# Patient Record
Sex: MALE | Race: WHITE | Employment: FULL TIME | URBAN - METROPOLITAN AREA
[De-identification: names, ages, dates, MRNs, and addresses within clinical notes are randomized per-mention and may not be internally consistent; named-entity substitution may affect disease eponyms.]

---

## 2017-01-18 ENCOUNTER — OFFICE VISIT (OUTPATIENT)
Dept: PEDIATRICS | Facility: CLINIC | Age: 47
End: 2017-01-18
Payer: COMMERCIAL

## 2017-01-18 VITALS
WEIGHT: 191.3 LBS | BODY MASS INDEX: 26.78 KG/M2 | SYSTOLIC BLOOD PRESSURE: 130 MMHG | HEIGHT: 71 IN | OXYGEN SATURATION: 98 % | TEMPERATURE: 98 F | DIASTOLIC BLOOD PRESSURE: 82 MMHG | HEART RATE: 74 BPM

## 2017-01-18 DIAGNOSIS — H61.23 BILATERAL IMPACTED CERUMEN: ICD-10-CM

## 2017-01-18 DIAGNOSIS — Z00.00 ENCOUNTER FOR HEALTH MAINTENANCE EXAMINATION IN ADULT: Primary | ICD-10-CM

## 2017-01-18 DIAGNOSIS — Z23 NEED FOR PROPHYLACTIC VACCINATION AND INOCULATION AGAINST INFLUENZA: ICD-10-CM

## 2017-01-18 DIAGNOSIS — R23.4 SCAB: ICD-10-CM

## 2017-01-18 DIAGNOSIS — H91.93 DECREASED HEARING OF BOTH EARS: ICD-10-CM

## 2017-01-18 PROCEDURE — 90471 IMMUNIZATION ADMIN: CPT | Performed by: INTERNAL MEDICINE

## 2017-01-18 PROCEDURE — 90686 IIV4 VACC NO PRSV 0.5 ML IM: CPT | Performed by: INTERNAL MEDICINE

## 2017-01-18 PROCEDURE — 99396 PREV VISIT EST AGE 40-64: CPT | Mod: GE | Performed by: INTERNAL MEDICINE

## 2017-01-18 NOTE — PROGRESS NOTES
Injectable Influenza Immunization Documentation    1.  Is the person to be vaccinated sick today?  No    2. Does the person to be vaccinated have an allergy to eggs or to a component of the vaccine?  No    3. Has the person to be vaccinated today ever had a serious reaction to influenza vaccine in the past?  No    4. Has the person to be vaccinated ever had Guillain-Rosston syndrome?  No     Form completed by Lanny Leonard MA

## 2017-01-18 NOTE — MR AVS SNAPSHOT
After Visit Summary   1/18/2017    Jordan Montano    MRN: [id number]           Patient Information     Date Of Birth          1970        Visit Information        Provider Department      1/18/2017 1:30 PM Robin Sharma MD [facility]        Today's Diagnoses     Encounter for health maintenance examination in adult    -  1       Care Instructions    Hearing: Irrigation of ears today, will monitor. Avoid loud noises. Use ear protection as much as possible. If not improving, will send to Audiology for formal evaluation. Debrox is an over the counter option.  One method is to put baby oil or olive oil into each ear on a regular basis. Put in the oil, let it sit in there for a few minutes, then lie down on a towel to let it drip out again. Probably only a few drops every week in each ear is good enough (nobody has done a research study on this). It doesn t hurt to put a few drops of clean olive oil or baby oil into each ear every day. Swimmers often do this prior to their daily swim. However, be sure that the oil is clean, as you don t want to introduce bacteria. This should not be done if you have an eardrum perforation or if you don t know whether or not you have a perforation.  Right chest wall scab: Aggressive moisturization  Checking cholesterol & glucose today  Flu shot today    Preventive Health Recommendations  Male Ages 40 to 49    Yearly exam:             See your health care provider every year in order to  o   Review health changes.   o   Discuss preventive care.    o   Review your medicines if your doctor has prescribed any.    You should be tested each year for STDs (sexually transmitted diseases) if you re at risk.     Have a cholesterol test every 5 years.     Have a colonoscopy (test for colon cancer) if someone in your family has had colon cancer or polyps before age 50.     After age 45, have a diabetes test (fasting glucose). If you are at risk for diabetes, you  should have this test every 3 years.      Talk with your health care provider about whether or not a prostate cancer screening test (PSA) is right for you.    Shots: Get a flu shot each year. Get a tetanus shot every 10 years.     Nutrition:    Eat at least 5 servings of fruits and vegetables daily.     Eat whole-grain bread, whole-wheat pasta and brown rice instead of white grains and rice.     Talk to your provider about Calcium and Vitamin D.     Lifestyle    Exercise for at least 150 minutes a week (30 minutes a day, 5 days a week). This will help you control your weight and prevent disease.     Limit alcohol to one drink per day.     No smoking.     Wear sunscreen to prevent skin cancer.     See your dentist every six months for an exam and cleaning.            Follow-ups after your visit        Future tests that were ordered for you today     Open Future Orders        Priority Expected Expires Ordered    Lipid Profile (Chol, Trig, HDL, LDL calc) Routine  1/18/2018 1/18/2017            Who to contact     If you have questions or need follow up information about today's clinic visit or your schedule please contact [facility] directly at [phone].  Normal or non-critical lab and imaging results will be communicated to you by LifeScribehart, letter or phone within 4 business days after the clinic has received the results. If you do not hear from us within 7 days, please contact the clinic through NowThis Newst or phone. If you have a critical or abnormal lab result, we will notify you by phone as soon as possible.  Submit refill requests through Paxer or call your pharmacy and they will forward the refill request to us. Please allow 3 business days for your refill to be completed.          Additional Information About Your Visit        Paxer Information     Paxer lets you send messages to your doctor, view your test results, renew your prescriptions, schedule appointments and more. To sign up, go to  [email or web address] . Click on \"Log in\" on the left side of the screen, which will take you to the Welcome page. Then click on \"Sign up Now\" on the right side of the page.     You will be asked to enter the access code listed below, as well as some personal information. Please follow the directions to create your username and password.     Your access code is: [email or web address]  Expires: 2017  2:16 PM     Your access code will  in 90 days. If you need help or a new code, please call your [place] clinic or [phone].        Care EveryWhere ID     This is your Care EveryWhere ID. This could be used by other organizations to access your [place] medical records  [id number]        Your Vitals Were     Pulse Temperature Height BMI (Body Mass Index) Pulse Oximetry       74 98  F (36.7  C) (Tympanic) 5' 11\" (1.803 m) 26.69 kg/m2 98%        Blood Pressure from Last 3 Encounters:   17 130/82   14 130/82   12 108/60    Weight from Last 3 Encounters:   17 191 lb 4.8 oz (86.773 kg)   14 181 lb (82.101 kg)   12 185 lb (83.915 kg)              We Performed the Following     GLUCOSE        Primary Care Provider Office Phone # Fax #    Franklin Olivas [phone]       [facility] [address] [place] [address]        Thank you!     Thank you for choosing [facility]  for your care. Our goal is always to provide you with excellent care. Hearing back from our patients is one way we can continue to improve our services. Please take a few minutes to complete the written survey that you may receive in the mail after your visit with us. Thank you!             Your Updated Medication List - Protect others around you: Learn how to safely use, store and throw away your medicines at www.disposemymeds.org.      Notice  As of 2017  2:18 PM    You have not been prescribed any medications.      "

## 2017-01-18 NOTE — NURSING NOTE
"Chief Complaint   Patient presents with     Physical       Initial /82 mmHg  Pulse 74  Temp(Src) 98  F (36.7  C) (Tympanic)  Ht 5' 11\" (1.803 m)  Wt 191 lb 4.8 oz (86.773 kg)  BMI 26.69 kg/m2  SpO2 98% Estimated body mass index is 26.69 kg/(m^2) as calculated from the following:    Height as of this encounter: 5' 11\" (1.803 m).    Weight as of this encounter: 191 lb 4.8 oz (86.773 kg).  BP completed using cuff size: regular    "
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
## 2017-01-18 NOTE — PATIENT INSTRUCTIONS
Hearing: Irrigation of ears today, will monitor. Avoid loud noises. Use ear protection as much as possible. If not improving, will send to Audiology for formal evaluation. Debrox is an over the counter option.  One method is to put baby oil or olive oil into each ear on a regular basis. Put in the oil, let it sit in there for a few minutes, then lie down on a towel to let it drip out again. Probably only a few drops every week in each ear is good enough (nobody has done a research study on this). It doesn t hurt to put a few drops of clean olive oil or baby oil into each ear every day. Swimmers often do this prior to their daily swim. However, be sure that the oil is clean, as you don t want to introduce bacteria. This should not be done if you have an eardrum perforation or if you don t know whether or not you have a perforation.  Right chest wall scab: Aggressive moisturization  Checking cholesterol & glucose today  Flu shot today    Preventive Health Recommendations  Male Ages 40 to 49    Yearly exam:             See your health care provider every year in order to  o   Review health changes.   o   Discuss preventive care.    o   Review your medicines if your doctor has prescribed any.    You should be tested each year for STDs (sexually transmitted diseases) if you re at risk.     Have a cholesterol test every 5 years.     Have a colonoscopy (test for colon cancer) if someone in your family has had colon cancer or polyps before age 50.     After age 45, have a diabetes test (fasting glucose). If you are at risk for diabetes, you should have this test every 3 years.      Talk with your health care provider about whether or not a prostate cancer screening test (PSA) is right for you.    Shots: Get a flu shot each year. Get a tetanus shot every 10 years.     Nutrition:    Eat at least 5 servings of fruits and vegetables daily.     Eat whole-grain bread, whole-wheat pasta and brown rice instead of white grains and  rice.     Talk to your provider about Calcium and Vitamin D.     Lifestyle    Exercise for at least 150 minutes a week (30 minutes a day, 5 days a week). This will help you control your weight and prevent disease.     Limit alcohol to one drink per day.     No smoking.     Wear sunscreen to prevent skin cancer.     See your dentist every six months for an exam and cleaning.

## 2017-01-20 DIAGNOSIS — Z00.00 ENCOUNTER FOR HEALTH MAINTENANCE EXAMINATION IN ADULT: ICD-10-CM

## 2017-01-20 LAB
CHOLEST SERPL-MCNC: 151 MG/DL
GLUCOSE SERPL-MCNC: 101 MG/DL (ref 70–99)
HDLC SERPL-MCNC: 42 MG/DL
LDLC SERPL CALC-MCNC: 88 MG/DL
NONHDLC SERPL-MCNC: 109 MG/DL
TRIGL SERPL-MCNC: 105 MG/DL

## 2017-01-20 PROCEDURE — 80061 LIPID PANEL: CPT | Performed by: PEDIATRICS

## 2017-01-20 PROCEDURE — 36415 COLL VENOUS BLD VENIPUNCTURE: CPT | Performed by: PEDIATRICS

## 2017-01-20 PROCEDURE — 82947 ASSAY GLUCOSE BLOOD QUANT: CPT | Performed by: PEDIATRICS

## 2017-01-20 NOTE — Clinical Note
[facility]  [address] [address]                  [phone]   January 25, 2017    Jordan Montano  [address] [facility] [address]          Jordan,    Thank you for coming in last Wednesday & having your labs checked this week. Your cholesterol testing was completely normal. Your glucose testing was slightly elevated, which could be one the earliest indications for pre-diabetes. Eating healthy & exercising will help with weight as well as glucose control. We will check this again next year at your next physical. Feel free to call the clinic with any questions or concerns.    Robin Sharma MD  [facility]          Results for orders placed or performed in visit on 01/20/17   Lipid Profile (Chol, Trig, HDL, LDL calc)   Result Value Ref Range    Cholesterol 151 <200 mg/dL    Triglycerides 105 <150 mg/dL    HDL Cholesterol 42 >39 mg/dL    LDL Cholesterol Calculated 88 <100 mg/dL    Non HDL Cholesterol 109 <130 mg/dL   Glucose   Result Value Ref Range    Glucose 101 (H) 70 - 99 mg/dL

## 2017-01-25 NOTE — PROGRESS NOTES
Quick Note:    Please mail results to patient. Merlyn Hollisnt,    Thank you for coming in last Wednesday & having your labs checked this week. Your cholesterol testing was completely normal. Your glucose testing was slightly elevated, which could be one the earliest indications for pre-diabetes. Eating healthy & exercising will help with weight as well as glucose control. We will check this again next year at your next physical. Feel free to call the clinic with any questions or concerns.    Robin Sharma MD  [facility]  ______

## 2018-03-22 ENCOUNTER — TRANSFERRED RECORDS (OUTPATIENT)
Dept: HEALTH INFORMATION MANAGEMENT | Facility: CLINIC | Age: 48
End: 2018-03-22

## 2018-03-23 ENCOUNTER — OFFICE VISIT (OUTPATIENT)
Dept: FAMILY MEDICINE | Facility: CLINIC | Age: 48
End: 2018-03-23
Payer: COMMERCIAL

## 2018-03-23 VITALS
RESPIRATION RATE: 18 BRPM | HEART RATE: 102 BPM | SYSTOLIC BLOOD PRESSURE: 118 MMHG | BODY MASS INDEX: 27.77 KG/M2 | OXYGEN SATURATION: 96 % | WEIGHT: 198.4 LBS | HEIGHT: 71 IN | TEMPERATURE: 97.4 F | DIASTOLIC BLOOD PRESSURE: 78 MMHG

## 2018-03-23 DIAGNOSIS — N20.1 CALCULUS OF URETER: Primary | ICD-10-CM

## 2018-03-23 PROBLEM — N40.0 BPH (BENIGN PROSTATIC HYPERPLASIA): Status: ACTIVE | Noted: 2018-03-23

## 2018-03-23 PROCEDURE — 99000 SPECIMEN HANDLING OFFICE-LAB: CPT | Performed by: PHYSICIAN ASSISTANT

## 2018-03-23 PROCEDURE — 99214 OFFICE O/P EST MOD 30 MIN: CPT | Performed by: PHYSICIAN ASSISTANT

## 2018-03-23 PROCEDURE — 82365 CALCULUS SPECTROSCOPY: CPT | Mod: 90 | Performed by: PHYSICIAN ASSISTANT

## 2018-03-23 RX ORDER — CIPROFLOXACIN 500 MG/1
500 TABLET, FILM COATED ORAL 2 TIMES DAILY
Refills: 0 | COMMUNITY
Start: 2018-03-22 | End: 2018-04-23

## 2018-03-23 RX ORDER — OXYCODONE AND ACETAMINOPHEN 5; 325 MG/1; MG/1
5-325 TABLET ORAL EVERY 6 HOURS
Refills: 0 | COMMUNITY
Start: 2018-03-22 | End: 2018-04-23

## 2018-03-23 RX ORDER — TAMSULOSIN HYDROCHLORIDE 0.4 MG/1
0.4 CAPSULE ORAL DAILY
Refills: 0 | COMMUNITY
Start: 2018-03-22 | End: 2018-04-23

## 2018-03-23 NOTE — MR AVS SNAPSHOT
"              After Visit Summary   3/23/2018    Jordan Montano    MRN: [id number]           Patient Information     Date Of Birth          1970        Visit Information        Provider Department      3/23/2018 1:40 PM Juan Ramon Staley PA-C [facility]        Today's Diagnoses     Calculus of ureter    -  1       Follow-ups after your visit        Follow-up notes from your care team     Return for recheck if symptoms are not improving or per lab results.      Who to contact     If you have questions or need follow up information about today's clinic visit or your schedule please contact [facility] directly at [phone].  Normal or non-critical lab and imaging results will be communicated to you by MyChart, letter or phone within 4 business days after the clinic has received the results. If you do not hear from us within 7 days, please contact the clinic through Lvmamahart or phone. If you have a critical or abnormal lab result, we will notify you by phone as soon as possible.  Submit refill requests through [email or web address] or call your pharmacy and they will forward the refill request to us. Please allow 3 business days for your refill to be completed.          Additional Information About Your Visit        MyChart Information     [email or web address] lets you send messages to your doctor, view your test results, renew your prescriptions, schedule appointments and more. To sign up, go to [email or web address] . Click on \"Log in\" on the left side of the screen, which will take you to the Welcome page. Then click on \"Sign up Now\" on the right side of the page.     You will be asked to enter the access code listed below, as well as some personal information. Please follow the directions to create your username and password.     Your access code is: [email or web address]  Expires: 2018  2:22 PM     Your access code will  in 90 days. If you need help or a new code, please call your [place] " "clinic or [phone].        Care EveryWhere ID     This is your Care EveryWhere ID. This could be used by other organizations to access your [place] medical records  [id number]        Your Vitals Were     Pulse Temperature Respirations Height Pulse Oximetry BMI (Body Mass Index)    102 97.4  F (36.3  C) (Tympanic) 18 5' 11\" (1.803 m) 96% 27.67 kg/m2       Blood Pressure from Last 3 Encounters:   03/23/18 118/78   01/18/17 130/82   06/21/14 130/82    Weight from Last 3 Encounters:   03/23/18 198 lb 6.4 oz (90 kg)   01/18/17 191 lb 4.8 oz (86.8 kg)   06/21/14 181 lb (82.1 kg)               Primary Care Provider Office Phone # Fax #    Franklin Olivas [phone] [address] [place] [address]        Equal Access to Services     [facility]: Hadii aad ku hadasho Soomaali, waaxda luqadaha, qaybta kaalmada adeegyada, waxay kelechi salazar . So [facility] [phone].    ATENCIÓN: Si asherla espaga, tiene a castro disposición servicios gratuitos de asistencia lingüística. Llame al [phone].    We comply with applicable federal civil rights laws and [place] laws. We do not discriminate on the basis of race, color, national origin, age, disability, sex, sexual orientation, or gender identity.            Thank you!     Thank you for choosing [facility]  for your care. Our goal is always to provide you with excellent care. Hearing back from our patients is one way we can continue to improve our services. Please take a few minutes to complete the written survey that you may receive in the mail after your visit with us. Thank you!             Your Updated Medication List - Protect others around you: Learn how to safely use, store and throw away your medicines at www.disposemymeds.org.          This list is accurate as of 3/23/18  2:22 PM.  Always use your most recent med list.                   Brand Name Dispense Instructions for use Diagnosis    ciprofloxacin " 500 MG tablet    CIPRO     Take 500 mg by mouth 2 times daily        oxyCODONE-acetaminophen 5-325 MG per tablet    PERCOCET     Take 5-325 tablets by mouth every 6 hours        tamsulosin 0.4 MG capsule    FLOMAX     Take 0.4 mg by mouth daily

## 2018-03-23 NOTE — PROGRESS NOTES
"  SUBJECTIVE:   Jordan Montano is a 48 year old male who presents to clinic today for the following health issues:    ED/UC Followup:  Facility:  [facility]   Date of visit: 03/22/18  Reason for visit: Kidney Stones   Current Status: Patient states that since being seen, he is feeling better. Patient was given Cipro and Flomax, and both have helped. Patient also states that the stone passed this morning.      -Patient is a 47yo male with recent dx of nephrolithiasis   -initially noted a sudden \"muscle cramp\" that would not go away  -went to urgency room; ct scan showed stone  -got some medication - flomax and cipro; passed the stone this morning  -he denies any hematuria, previously or currently  -urination since that time has been fine  -drinks a bit of coffee, mostly water    Problem list and histories reviewed & adjusted, as indicated.  Additional history: as documented    Patient Active Problem List   Diagnosis     Cardiac Risk Factors     Lumbar Degenerative Disc Dz     CARDIOVASCULAR SCREENING; LDL GOAL LESS THAN 160     Past Surgical History:   Procedure Laterality Date     L4/5 LAMINECT/DISCECTOMY, LUMBAR  2007     VASECTOMY  2005       Social History   Substance Use Topics     Smoking status: Never Smoker     Smokeless tobacco: Never Used     Alcohol use Yes      Comment: occasional social use     Family History   Problem Relation Age of Onset     Family History Negative Mother      Cardiovascular Maternal Uncle      Heart bypass at age 33     Prostate Cancer Maternal Uncle      GASTROINTESTINAL DISEASE Child      celiac sprue     DIABETES No family hx of      Cancer - colorectal No family hx of            Reviewed and updated as needed this visit by clinical staff       Reviewed and updated as needed this visit by Provider         ROS:  Constitutional, HEENT, cardiovascular, pulmonary, gi and gu systems are negative, except as otherwise noted.    OBJECTIVE:     /78 (BP Location: Right arm, " "Patient Position: Chair, Cuff Size: Adult Large)  Pulse 102  Temp 97.4  F (36.3  C) (Tympanic)  Resp 18  Ht 5' 11\" (1.803 m)  Wt 198 lb 6.4 oz (90 kg)  SpO2 96%  BMI 27.67 kg/m2  Body mass index is 27.67 kg/(m^2).  GENERAL: healthy, alert and no distress  RESP: lungs clear to auscultation - no rales, rhonchi or wheezes  CV: regular rate and rhythm, normal S1 S2, no S3 or S4, no murmur, click or rub, no peripheral edema and peripheral pulses strong  ABDOMEN: soft, nontender, no hepatosplenomegaly, no masses and bowel sounds normal  BACK: no CVA tenderness, no paralumbar tenderness    Diagnostic Test Results:  See A/P    ASSESSMENT/PLAN:   1. Calculus of ureter  He did pass this earlier today. Feels improved. He was started on cipro for uti however in viewing his urine exam from the UR there was no evidence for infection. I do note on the CT scan some prostatomegaly, perhaps early prostatitis? We reviewed this as well and discussed prostate cancer screening etc down the road and we'll keep an eye on this. For now we'll analyze the stone and have him finish his abx. He'll follow up with any return of or worsening symptoms.   - Stone analysis; Future  - Stone analysis    Juan Ramon Staley PA-C  [facility] [place]  "

## 2018-03-28 LAB
APPEARANCE STONE: NORMAL
COMPN STONE: NORMAL
NUMBER STONE: 1
SIZE STONE: NORMAL MM
WT STONE: 8 MG

## 2018-03-30 ENCOUNTER — MYC MEDICAL ADVICE (OUTPATIENT)
Dept: FAMILY MEDICINE | Facility: CLINIC | Age: 48
End: 2018-03-30

## 2018-04-18 ENCOUNTER — MYC MEDICAL ADVICE (OUTPATIENT)
Dept: FAMILY MEDICINE | Facility: CLINIC | Age: 48
End: 2018-04-18

## 2018-04-18 NOTE — TELEPHONE ENCOUNTER
Mars:    Pt saw you on 3/23 for kidney stones.   Please see below and advise. Thanks.     Ivett Napier RN -- [facility]

## 2018-04-23 ENCOUNTER — OFFICE VISIT (OUTPATIENT)
Dept: FAMILY MEDICINE | Facility: CLINIC | Age: 48
End: 2018-04-23
Payer: COMMERCIAL

## 2018-04-23 VITALS
HEIGHT: 71 IN | TEMPERATURE: 97.4 F | DIASTOLIC BLOOD PRESSURE: 96 MMHG | RESPIRATION RATE: 18 BRPM | OXYGEN SATURATION: 98 % | WEIGHT: 202.8 LBS | BODY MASS INDEX: 28.39 KG/M2 | HEART RATE: 70 BPM | SYSTOLIC BLOOD PRESSURE: 138 MMHG

## 2018-04-23 DIAGNOSIS — R39.12 BENIGN PROSTATIC HYPERPLASIA WITH WEAK URINARY STREAM: Primary | ICD-10-CM

## 2018-04-23 DIAGNOSIS — R03.0 ELEVATED BLOOD PRESSURE READING WITHOUT DIAGNOSIS OF HYPERTENSION: ICD-10-CM

## 2018-04-23 DIAGNOSIS — N40.1 BENIGN PROSTATIC HYPERPLASIA WITH WEAK URINARY STREAM: Primary | ICD-10-CM

## 2018-04-23 PROCEDURE — G0103 PSA SCREENING: HCPCS | Performed by: PHYSICIAN ASSISTANT

## 2018-04-23 PROCEDURE — 99213 OFFICE O/P EST LOW 20 MIN: CPT | Performed by: PHYSICIAN ASSISTANT

## 2018-04-23 RX ORDER — TAMSULOSIN HYDROCHLORIDE 0.4 MG/1
0.4 CAPSULE ORAL DAILY
Qty: 90 CAPSULE | Refills: 3 | Status: SHIPPED | OUTPATIENT
Start: 2018-04-23 | End: 2019-04-22

## 2018-04-23 NOTE — MR AVS SNAPSHOT
After Visit Summary   4/23/2018    Jordan Montano    MRN: [id number]           Patient Information     Date Of Birth          1970        Visit Information        Provider Department      4/23/2018 9:00 AM Juan Ramon Staley PA-C [facility]        Today's Diagnoses     Benign prostatic hyperplasia with weak urinary stream    -  1    Elevated blood pressure reading without diagnosis of hypertension           Follow-ups after your visit        Follow-up notes from your care team     Return in about 1 year (around 4/23/2019) for Routine Visit.      Who to contact     If you have questions or need follow up information about today's clinic visit or your schedule please contact [facility] directly at [phone].  Normal or non-critical lab and imaging results will be communicated to you by MyChart, letter or phone within 4 business days after the clinic has received the results. If you do not hear from us within 7 days, please contact the clinic through D[employer] or phone. If you have a critical or abnormal lab result, we will notify you by phone as soon as possible.  Submit refill requests through [employer] or call your pharmacy and they will forward the refill request to us. Please allow 3 business days for your refill to be completed.          Additional Information About Your Visit        MyChart Information     Superfly gives you secure access to your electronic health record. If you see a primary care provider, you can also send messages to your care team and make appointments. If you have questions, please call your primary care clinic.  If you do not have a primary care provider, please call [phone] and they will assist you.        Care EveryWhere ID     This is your Care EveryWhere ID. This could be used by other organizations to access your [place] medical records  [id number]        Your Vitals Were     Pulse Temperature Respirations Height Pulse  "Oximetry BMI (Body Mass Index)    70 97.4  F (36.3  C) (Tympanic) 18 5' 11\" (1.803 m) 98% 28.28 kg/m2       Blood Pressure from Last 3 Encounters:   04/23/18 (!) 138/98   03/23/18 118/78   01/18/17 130/82    Weight from Last 3 Encounters:   04/23/18 202 lb 12.8 oz (92 kg)   03/23/18 198 lb 6.4 oz (90 kg)   01/18/17 191 lb 4.8 oz (86.8 kg)              We Performed the Following     Prostate spec antigen screen          Where to get your medicines      These medications were sent to [email or web address] [address] [facility] [address] [place] [address] & [facility]  [address], [facility] [address]     Phone:  [phone]     tamsulosin 0.4 MG capsule          Primary Care Provider Office Phone # Fax #    Juan Ramon Staley PA-C [phone] [address]  [facility] [address]        Equal Access to Services     [facility] : Hadii aad ku hadasho Soomaali, waaxda luqadaha, qaybta kaalmada adeegyada, angy lorenz hayconnor salazar . So [facility] [phone].    ATENCIÓN: Si habla español, tiene a castro disposición servicios gratuitos de asistencia lingüística. [facility] [phone].    We comply with applicable federal civil rights laws and [place] laws. We do not discriminate on the basis of race, color, national origin, age, disability, sex, sexual orientation, or gender identity.            Thank you!     Thank you for choosing [facility]  for your care. Our goal is always to provide you with excellent care. Hearing back from our patients is one way we can continue to improve our services. Please take a few minutes to complete the written survey that you may receive in the mail after your visit with us. Thank you!             Your Updated Medication List - Protect others around you: Learn how to safely use, store and throw away your medicines at www.disposemymeds.org.          This list is accurate as of 4/23/18  9:47 AM.  Always use your " most recent med list.                   Brand Name Dispense Instructions for use Diagnosis    tamsulosin 0.4 MG capsule    FLOMAX    90 capsule    Take 1 capsule (0.4 mg) by mouth daily    Benign prostatic hyperplasia with weak urinary stream

## 2018-04-23 NOTE — PROGRESS NOTES
SUBJECTIVE:   Jordan Montano is a 48 year old male who presents to clinic today for the following health issues:    Medication Followup of Flomax    Taking Medication as prescribed: yes, but has not for a few weeks, has been out.     Side Effects:  None    Medication Helping Symptoms:  yes     -Patient presents to discuss flomax Rx  -Initially put on this to help with kidney stone but noticed a big difference when off   -has since passed the stone  -Now in hindsight notes that he had been having urinary changes over the past 2 years  -A lower flow, and some frequency  -Does drink coffee, water on a regular basis but not excessive  -CT scan earlier this year did show prostatomegaly    Problem list and histories reviewed & adjusted, as indicated.  Additional history: as documented    Patient Active Problem List   Diagnosis     Cardiac Risk Factors     Lumbar Degenerative Disc Dz     CARDIOVASCULAR SCREENING; LDL GOAL LESS THAN 160     BPH (benign prostatic hyperplasia)     Past Surgical History:   Procedure Laterality Date     L4/5 LAMINECT/DISCECTOMY, LUMBAR  2007     VASECTOMY  2005       Social History   Substance Use Topics     Smoking status: Never Smoker     Smokeless tobacco: Never Used     Alcohol use Yes      Comment: occasional social use     Family History   Problem Relation Age of Onset     Family History Negative Mother      Cardiovascular Maternal Uncle      Heart bypass at age 33     Prostate Cancer Maternal Uncle      GASTROINTESTINAL DISEASE Child      celiac sprue     DIABETES No family hx of      Cancer - colorectal No family hx of            Reviewed and updated as needed this visit by clinical staff  Tobacco  Allergies  Meds  Med Hx  Surg Hx  Fam Hx  Soc Hx      Reviewed and updated as needed this visit by Provider         ROS:  Constitutional, HEENT, cardiovascular, pulmonary, gi and gu systems are negative, except as otherwise noted.    OBJECTIVE:     BP (!) 138/96  Pulse 70  Temp  "97.4  F (36.3  C) (Tympanic)  Resp 18  Ht 5' 11\" (1.803 m)  Wt 202 lb 12.8 oz (92 kg)  SpO2 98%  BMI 28.28 kg/m2  Body mass index is 28.28 kg/(m^2).  GENERAL: healthy, alert and no distress  RECTAL (male): normal sphincter tone, no rectal masses, prostate mildly enlarged, smooth, nontender without nodules or masses    Diagnostic Test Results:  Update PSA    ASSESSMENT/PLAN:   1. Benign prostatic hyperplasia with weak urinary stream  CT scan 3/22 showed prostatomegaly. Patient recently started on flomax 2/2 stone, and noted significant improvement in urinary symptoms. Prostate exam with enlargement today. No nodules palpated. Updating psa.   - Prostate spec antigen screen  - tamsulosin (FLOMAX) 0.4 MG capsule; Take 1 capsule (0.4 mg) by mouth daily  Dispense: 90 capsule; Refill: 3    2. Elevated blood pressure reading without diagnosis of hypertension  Historically controlled. May be helped with flomax but we'll want to watch this closely. Ok to recheck at pharmacy a few times within the next few months while making life style improvements    Juan Ramon Staley PA-C  [facility] [place]  "

## 2018-04-24 LAB — PSA SERPL-ACNC: 4.33 UG/L (ref 0–4)

## 2018-05-08 ENCOUNTER — OFFICE VISIT (OUTPATIENT)
Dept: FAMILY MEDICINE | Facility: CLINIC | Age: 48
End: 2018-05-08
Payer: COMMERCIAL

## 2018-05-08 VITALS
HEIGHT: 71 IN | WEIGHT: 197 LBS | BODY MASS INDEX: 27.58 KG/M2 | SYSTOLIC BLOOD PRESSURE: 137 MMHG | HEART RATE: 75 BPM | DIASTOLIC BLOOD PRESSURE: 88 MMHG | TEMPERATURE: 97.2 F | RESPIRATION RATE: 18 BRPM | OXYGEN SATURATION: 98 %

## 2018-05-08 DIAGNOSIS — R30.0 DYSURIA: Primary | ICD-10-CM

## 2018-05-08 LAB
ALBUMIN UR-MCNC: NEGATIVE MG/DL
APPEARANCE UR: CLEAR
BILIRUB UR QL STRIP: NEGATIVE
COLOR UR AUTO: YELLOW
GLUCOSE UR STRIP-MCNC: NEGATIVE MG/DL
HGB UR QL STRIP: ABNORMAL
KETONES UR STRIP-MCNC: 40 MG/DL
LEUKOCYTE ESTERASE UR QL STRIP: NEGATIVE
NITRATE UR QL: NEGATIVE
PH UR STRIP: 6 PH (ref 5–7)
RBC #/AREA URNS AUTO: NORMAL /HPF
SOURCE: ABNORMAL
SP GR UR STRIP: 1.01 (ref 1–1.03)
UROBILINOGEN UR STRIP-ACNC: 0.2 EU/DL (ref 0.2–1)
WBC #/AREA URNS AUTO: NORMAL /HPF

## 2018-05-08 PROCEDURE — 81001 URINALYSIS AUTO W/SCOPE: CPT | Performed by: PHYSICIAN ASSISTANT

## 2018-05-08 PROCEDURE — 99213 OFFICE O/P EST LOW 20 MIN: CPT | Performed by: PHYSICIAN ASSISTANT

## 2018-05-08 NOTE — PROGRESS NOTES
"  SUBJECTIVE:   Jordan Montano is a 48 year old male who presents to clinic today for the following health issues:    Genitourinary symptoms    Duration: Yesterday     Description:  dysuria    Intensity:  moderate    Accompanying signs and symptoms (fever/discharge/nausea/vomiting/back or abdominal pain):  Fever     History (frequent UTI's/kidney stones/prostate problems): Kidney stone in the end of March   Sexually active: YES    Precipitating or alleviating factors: None    Therapies tried and outcome: Advil, somewhat effective     -Patient presents with a 2 day hx of \"feeling off\"  -When he woke up, felt a slight feverish  -when he urinated he felt some irritation, increased as the day went on  -by 11pm, fever improved  -when urinating today, he still feels some irritation with urinating  -there is no blood in the urine; no vomiting or diarrhea  -denies rectal irritation  -no back pain  -no discharge from the penis  -last intercourse last week    Problem list and histories reviewed & adjusted, as indicated.  Additional history: as documented    Patient Active Problem List   Diagnosis     Cardiac Risk Factors     Lumbar Degenerative Disc Dz     CARDIOVASCULAR SCREENING; LDL GOAL LESS THAN 160     BPH (benign prostatic hyperplasia)     Past Surgical History:   Procedure Laterality Date     L4/5 LAMINECT/DISCECTOMY, LUMBAR  2007     VASECTOMY  2005       Social History   Substance Use Topics     Smoking status: Never Smoker     Smokeless tobacco: Never Used     Alcohol use Yes      Comment: occasional social use     Family History   Problem Relation Age of Onset     Family History Negative Mother      Cardiovascular Maternal Uncle      Heart bypass at age 33     Prostate Cancer Maternal Uncle      GASTROINTESTINAL DISEASE Child      celiac sprue     DIABETES No family hx of      Cancer - colorectal No family hx of            Reviewed and updated as needed this visit by clinical staff       Reviewed and updated as " "needed this visit by Provider         ROS:  Constitutional, HEENT, cardiovascular, pulmonary, gi and gu systems are negative, except as otherwise noted.    OBJECTIVE:     /88 (BP Location: Right arm, Patient Position: Chair, Cuff Size: Adult Large)  Pulse 75  Temp 97.2  F (36.2  C) (Tympanic)  Resp 18  Ht 5' 11\" (1.803 m)  Wt 197 lb (89.4 kg)  SpO2 98%  BMI 27.48 kg/m2  Body mass index is 27.48 kg/(m^2).  GENERAL: healthy, alert and no distress  ABDOMEN: tenderness suprapubic, no organomegaly or masses and bowel sounds normal   (male): normal male genitalia without lesions or urethral discharge, no hernia  RECTAL (male): patient deferred this today    Diagnostic Test Results:  Results for orders placed or performed in visit on 05/08/18 (from the past 24 hour(s))   *UA reflex to Microscopic and Culture ([place] [facility] (except [place] and [place])   Result Value Ref Range    Color Urine Yellow     Appearance Urine Clear     Glucose Urine Negative NEG^Negative mg/dL    Bilirubin Urine Negative NEG^Negative    Ketones Urine 40 (A) NEG^Negative mg/dL    Specific Gravity Urine 1.010 1.003 - 1.035    Blood Urine Trace (A) NEG^Negative    pH Urine 6.0 5.0 - 7.0 pH    Protein Albumin Urine Negative NEG^Negative mg/dL    Urobilinogen Urine 0.2 0.2 - 1.0 EU/dL    Nitrite Urine Negative NEG^Negative    Leukocyte Esterase Urine Negative NEG^Negative    Source Midstream Urine    Urine Microscopic   Result Value Ref Range    WBC Urine 0 - 5 OTO5^0 - 5 /HPF    RBC Urine O - 2 OTO2^O - 2 /HPF       ASSESSMENT/PLAN:   1. Dysuria  UA looks good. Patient did decline the rectal exam today but with the urine this is less likely prostatitis. Additionally, he notes he feels much improved today. Watchful waiting over the next day or two, if symptoms dont improve i'll prphylactically start abx. He'll return early June for repeat PSA.  - *UA reflex to Microscopic and Culture ([place] [facility] (except " [place] and [place]); Future  - *UA reflex to Microscopic and Culture ([place] [facility] (except [place] and [place])  - Urine Microscopic    Juan Ramon Staley PA-C  [facility] [place]

## 2018-05-08 NOTE — MR AVS SNAPSHOT
"              After Visit Summary   5/8/2018    Jordan Montano    MRN: [id number]           Patient Information     Date Of Birth          1970        Visit Information        Provider Department      5/8/2018 1:40 PM Juan Ramon Staley PA-C [facility]        Today's Diagnoses     Dysuria    -  1       Follow-ups after your visit        Who to contact     If you have questions or need follow up information about today's clinic visit or your schedule please contact [facility] directly at [phone].  Normal or non-critical lab and imaging results will be communicated to you by [employer], letter or phone within 4 business days after the clinic has received the results. If you do not hear from us within 7 days, please contact the clinic through [employer] or phone. If you have a critical or abnormal lab result, we will notify you by phone as soon as possible.  Submit refill requests through AOBiome or call your pharmacy and they will forward the refill request to us. Please allow 3 business days for your refill to be completed.          Additional Information About Your Visit        MyChart Information     A[employer] gives you secure access to your electronic health record. If you see a primary care provider, you can also send messages to your care team and make appointments. If you have questions, please call your primary care clinic.  If you do not have a primary care provider, please call [phone] and they will assist you.        Care EveryWhere ID     This is your Care EveryWhere ID. This could be used by other organizations to access your [place] medical records  [id number]        Your Vitals Were     Pulse Temperature Respirations Height Pulse Oximetry BMI (Body Mass Index)    75 97.2  F (36.2  C) (Tympanic) 18 5' 11\" (1.803 m) 98% 27.48 kg/m2       Blood Pressure from Last 3 Encounters:   05/08/18 137/88   04/23/18 (!) 138/96   03/23/18 118/78    Weight from Last 3 " Encounters:   05/08/18 197 lb (89.4 kg)   04/23/18 202 lb 12.8 oz (92 kg)   03/23/18 198 lb 6.4 oz (90 kg)              We Performed the Following     *UA reflex to Microscopic and Culture ([place] and [facility] (except [place] and [place])     Urine Microscopic        Primary Care Provider Office Phone # Fax #    Juan Ramon Staley PA-C [phone]       [address] [facility] [address]        Equal Access to Services     [facility] EVAN : Hadii aad ku hadasho Soomaali, waaxda luqadaha, qaybta kaalmada adeegyada, waxay idiin hayaan adeeg kharash lagilles . So [facility] [phone].    ATENCIÓN: Si habla español, tiene a castro disposición servicios gratuitos de asistencia lingüística. Llame al [phone].    We comply with applicable federal civil rights laws and [place] laws. We do not discriminate on the basis of race, color, national origin, age, disability, sex, sexual orientation, or gender identity.            Thank you!     Thank you for choosing [facility]  for your care. Our goal is always to provide you with excellent care. Hearing back from our patients is one way we can continue to improve our services. Please take a few minutes to complete the written survey that you may receive in the mail after your visit with us. Thank you!             Your Updated Medication List - Protect others around you: Learn how to safely use, store and throw away your medicines at www.disposemymeds.org.          This list is accurate as of 5/8/18  2:10 PM.  Always use your most recent med list.                   Brand Name Dispense Instructions for use Diagnosis    tamsulosin 0.4 MG capsule    FLOMAX    90 capsule    Take 1 capsule (0.4 mg) by mouth daily    Benign prostatic hyperplasia with weak urinary stream

## 2018-06-04 DIAGNOSIS — R39.12 BENIGN PROSTATIC HYPERPLASIA WITH WEAK URINARY STREAM: ICD-10-CM

## 2018-06-04 DIAGNOSIS — N40.1 BENIGN PROSTATIC HYPERPLASIA WITH WEAK URINARY STREAM: ICD-10-CM

## 2018-06-04 LAB — PSA SERPL-ACNC: 4.99 UG/L (ref 0–4)

## 2018-06-04 PROCEDURE — G0103 PSA SCREENING: HCPCS | Performed by: PHYSICIAN ASSISTANT

## 2018-06-05 ENCOUNTER — OFFICE VISIT (OUTPATIENT)
Dept: FAMILY MEDICINE | Facility: CLINIC | Age: 48
End: 2018-06-05
Payer: COMMERCIAL

## 2018-06-05 VITALS
SYSTOLIC BLOOD PRESSURE: 133 MMHG | WEIGHT: 193 LBS | RESPIRATION RATE: 20 BRPM | DIASTOLIC BLOOD PRESSURE: 87 MMHG | HEART RATE: 62 BPM | HEIGHT: 71 IN | BODY MASS INDEX: 27.02 KG/M2 | TEMPERATURE: 98.1 F

## 2018-06-05 DIAGNOSIS — N40.0 BENIGN PROSTATIC HYPERPLASIA WITHOUT LOWER URINARY TRACT SYMPTOMS: Primary | ICD-10-CM

## 2018-06-05 DIAGNOSIS — R97.20 ELEVATED PROSTATE SPECIFIC ANTIGEN (PSA): ICD-10-CM

## 2018-06-05 DIAGNOSIS — Z71.84 TRAVEL ADVICE ENCOUNTER: Primary | ICD-10-CM

## 2018-06-05 PROCEDURE — 99402 PREV MED CNSL INDIV APPRX 30: CPT | Performed by: FAMILY MEDICINE

## 2018-06-05 RX ORDER — CIPROFLOXACIN 500 MG/1
500 TABLET, FILM COATED ORAL 2 TIMES DAILY
Qty: 6 TABLET | Refills: 0 | Status: SHIPPED | OUTPATIENT
Start: 2018-06-05 | End: 2019-02-25

## 2018-06-05 RX ORDER — ATOVAQUONE AND PROGUANIL HYDROCHLORIDE 250; 100 MG/1; MG/1
1 TABLET, FILM COATED ORAL DAILY
Qty: 18 TABLET | Refills: 0 | Status: SHIPPED | OUTPATIENT
Start: 2018-06-05 | End: 2019-02-25

## 2018-06-05 NOTE — PATIENT INSTRUCTIONS
Oral typhoid vaccine is recommended to be administered 1 hour before a meal with a cold or lukewarm drink (temperature not to exceed body temperature--98.6 F [37 C]) on alternate days, for a total of 4 doses. Vivotif should not be taken by people taking antibiotics, as these may inactivate the vaccine. If you are taking an antibiotic you should not start taking this vaccine until at least three days after you have finished the course of antibiotic. It is also preferable that a course of antibiotics is not started until at least three days after you have finished taking the vaccine.    See travel packet provided  Recommend ultrathon, pepto bismol and imodium  Also bring hand  and sun screen with you.  Safe Travels

## 2018-06-05 NOTE — PROGRESS NOTES
SUBJECTIVE: Jordan Montano , a 48 year old  male, presents for counseling and information regarding upcoming travel to [facility]. Special medical concerns include: none. He. anticipates the following unusual exposures: none       Itinerary: (List all countries)  [facility]  Departure Date: 06/14/2018, Return Date: 06/22/2018  Reason for Travel (i.e. business, pleasure): business  Visiting an urban or rural area? Urban-- [facility]  Accommodations (i.e. hotel, hostel, friends, family etc.): hotel  Women - First day of your last period: NA    IMMUNIZATION HISTORY  Have you received any vaccinations in the past 4 weeks?  No   Have you ever fainted from having your blood drawn or from an injection?  No  Have you ever had a fever reaction to a vaccination?  No  Have you had any bad reaction / side effect from any vaccination?  No  Have you ever had hepatitis A or B vaccine?  yes  Do you live (or work closely with anyone who has AIDS, or any other immune disorder, or who is on chemotherapy for cancer or family history of immunodeficiency?  No  Have you received any injection of immune globulin or any blood products during the past 12 months?  No    GENERAL MEDICAL HISTORY  Do you have a medical condition that warrants maintenance meds or physician follow-up?  yes  Do you have a medical condition that is stable now, but that may recur while traveling?  No  Has your spleen been removed?   No  Have you had an acute illness or a fever in the past 48 hours?  No  Are you pregnant or might you become pregnant on this trip? Any chance of pregnancy?  No  Are you breastfeeding?  No  Do you have HIV, AIDS, an AIDS-like condition, any other immune disorder, leukemia or cancer?  No  Do you have a severe combined immunodeficiency disease?  No  Have you had your thymus gland removed or a history of problems with your thymus, such as myasthenia gravis, DiGeorge syndrome, or thymoma?  No  Do you have severe thrombocytopenia (low platelet  count) or blood clotting disorder?  No  Have you ever had a convulsion, seizure, epilepsy, neurologic condition or brain infection?  No  Do you have any stomach conditions?  No  Do you have a G6PD deficiency?  No  Do you have severe renal or kidney impairment?  No  Do you have a history of psychiatric problems?  No  Do you have a problem with strange dreams and/or nightmares?  No  Do you have insomnia?  No  Do you have problems with vaginitis?  No  Do you have psoriasis?  No  Are you prone to motion sickness?  No  Have you ever had headaches, nausea, vomiting or breathing problems from altitude exposure?  No    MEDICATIONS  ARE YOU TAKING:  Steroids, prednisone, or anti-cancer drugs?  No  Antibiotics or sulfonamides?  No  Oral contraceptives?  No  Aspirin therapy? (children & adolescents)  No    ALLERGIES  ARE YOU ALLERGIC TO:  Any medications?  No  Any foods or other?  No     Patient Active Problem List   Diagnosis     Cardiac Risk Factors     Lumbar Degenerative Disc Dz     CARDIOVASCULAR SCREENING; LDL GOAL LESS THAN 160     BPH (benign prostatic hyperplasia)         Past Medical History:   Diagnosis Date     NO ACTIVE PROBLEMS       Immunization History   Administered Date(s) Administered     Influenza (IIV3) PF 11/28/2005, 09/17/2010, 09/05/2012     Influenza Vaccine IM 3yrs+ 4 Valent IIV4 01/18/2017     Poliovirus, inactivated (IPV) 08/18/2010     TD (ADULT, 7+) 09/10/2002     TDAP Vaccine (Boostrix) 11/19/2012     Tdap (Adacel,Boostrix) 11/19/2010     Twinrix A/B 08/18/2010, 09/17/2010, 02/24/2011     Typhoid Oral 08/18/2010       Current Outpatient Prescriptions   Medication Sig Dispense Refill     atovaquone-proguanil (MALARONE) 250-100 MG per tablet Take 1 tablet by mouth daily Start 2 days before travel and continue 7 days after return. 18 tablet 0     ciprofloxacin (CIPRO) 500 MG tablet Take 1 tablet (500 mg) by mouth 2 times daily For traveler's diarrhea 6 tablet 0     tamsulosin (FLOMAX) 0.4 MG capsule  Take 1 capsule (0.4 mg) by mouth daily 90 capsule 3     typhoid (VIVOTIF) CR capsule Take 1 capsule by mouth every other day 4 capsule 0     No Known Allergies     EXAM: deferred    Immunizations discussed include: Typhoid  Malaraia prophylaxis recommended: Malarone  Symptomatic treatment for traveler's diarrhea: ciprofloxacin    ASSESSMENT/PLAN:  (Z71.89) Travel advice encounter  (primary encounter diagnosis)  Plan: typhoid (VIVOTIF) CR capsule,         atovaquone-proguanil (MALARONE) 250-100 MG per         tablet, ciprofloxacin (CIPRO) 500 MG tablet      I have reviewed general recommendations for safe travel   including: food/water precautions, insect avoidance, safe sex   practices given high prevalence of HIV and other STDs,   roadway safety. Educational materials and links to the [employer]   Traveler's health website have been provided.    Total time 30 minutes, greater than 50 percent in counseling   and coordination of care.    Malika Colmenares MD   [facility]

## 2018-06-05 NOTE — MR AVS SNAPSHOT
After Visit Summary   6/5/2018    Jordan Montano    MRN: [id number]           Patient Information     Date Of Birth          1970        Visit Information        Provider Department      6/5/2018 9:00 AM Malika Archibald MD [facility]        Today's Diagnoses     Travel advice encounter    -  1      Care Instructions      Oral typhoid vaccine is recommended to be administered 1 hour before a meal with a cold or lukewarm drink (temperature not to exceed body temperature--98.6 F [37 C]) on alternate days, for a total of 4 doses. Vivotif should not be taken by people taking antibiotics, as these may inactivate the vaccine. If you are taking an antibiotic you should not start taking this vaccine until at least three days after you have finished the course of antibiotic. It is also preferable that a course of antibiotics is not started until at least three days after you have finished taking the vaccine.    See travel packet provided  Recommend ultrathon, pepto bismol and imodium  Also bring hand  and sun screen with you.  Safe Travels           Follow-ups after your visit        Who to contact     If you have questions or need follow up information about today's clinic visit or your schedule please contact [facility] directly at [phone].  Normal or non-critical lab and imaging results will be communicated to you by MyChart, letter or phone within 4 business days after the clinic has received the results. If you do not hear from us within 7 days, please contact the clinic through MyChart or phone. If you have a critical or abnormal lab result, we will notify you by phone as soon as possible.  Submit refill requests through [employer] or call your pharmacy and they will forward the refill request to us. Please allow 3 business days for your refill to be completed.          Additional Information About Your Visit        MyChart Information      "[employer] gives you secure access to your electronic health record. If you see a primary care provider, you can also send messages to your care team and make appointments. If you have questions, please call your primary care clinic.  If you do not have a primary care provider, please call [phone] and they will assist you.        Care EveryWhere ID     This is your Care EveryWhere ID. This could be used by other organizations to access your [place] medical records  [id number]        Your Vitals Were     Pulse Temperature Respirations Height BMI (Body Mass Index)       62 98.1  F (36.7  C) (Oral) 20 5' 11\" (1.803 m) 26.92 kg/m2        Blood Pressure from Last 3 Encounters:   06/05/18 133/87   05/08/18 137/88   04/23/18 (!) 138/96    Weight from Last 3 Encounters:   06/05/18 193 lb (87.5 kg)   05/08/18 197 lb (89.4 kg)   04/23/18 202 lb 12.8 oz (92 kg)              Today, you had the following     No orders found for display         Today's Medication Changes          These changes are accurate as of 6/5/18  9:24 AM.  If you have any questions, ask your nurse or doctor.               Start taking these medicines.        Dose/Directions    atovaquone-proguanil 250-100 MG per tablet   Commonly known as:  MALARONE   Used for:  Travel advice encounter   Started by:  Malika Archibald MD        Dose:  1 tablet   Take 1 tablet by mouth daily Start 2 days before travel and continue 7 days after return.   Quantity:  18 tablet   Refills:  0       ciprofloxacin 500 MG tablet   Commonly known as:  CIPRO   Used for:  Travel advice encounter   Started by:  Malika Archibald MD        Dose:  500 mg   Take 1 tablet (500 mg) by mouth 2 times daily For traveler's diarrhea   Quantity:  6 tablet   Refills:  0       typhoid CR capsule   Commonly known as:  VIVOTIF   Used for:  Travel advice encounter   Started by:  Malika Archibald MD        Dose:  1 capsule   Take 1 capsule by mouth every other day "   Quantity:  4 capsule   Refills:  0            Where to get your medicines      These medications were sent to [facility], [place] - [address]  [address] [facility] [address]     Phone:  [phone]     atovaquone-proguanil 250-100 MG per tablet    ciprofloxacin 500 MG tablet    typhoid CR capsule                Primary Care Provider Office Phone # Fax #    Juan Ramon Staley PA-C [phone] [address]  [facility] [address]        Equal Access to Services     [facility]: Hadii aad ku hadasho Soomaali, waaxda luqadaha, qaybta kaalmada adeegyada, waxay idiin hayaan adeeg kharaaranza salazar . So [facility] [phone].    ATENCIÓN: Si habla español, tiene a castro disposición servicios gratuitos de asistencia lingüística. [facility] [phone].    We comply with applicable federal civil rights laws and [place] laws. We do not discriminate on the basis of race, color, national origin, age, disability, sex, sexual orientation, or gender identity.            Thank you!     Thank you for choosing [facility]  for your care. Our goal is always to provide you with excellent care. Hearing back from our patients is one way we can continue to improve our services. Please take a few minutes to complete the written survey that you may receive in the mail after your visit with us. Thank you!             Your Updated Medication List - Protect others around you: Learn how to safely use, store and throw away your medicines at www.disposemymeds.org.          This list is accurate as of 6/5/18  9:24 AM.  Always use your most recent med list.                   Brand Name Dispense Instructions for use Diagnosis    atovaquone-proguanil 250-100 MG per tablet    MALARONE    18 tablet    Take 1 tablet by mouth daily Start 2 days before travel and continue 7 days after return.    Travel advice encounter       ciprofloxacin 500 MG tablet    CIPRO    6 tablet     Take 1 tablet (500 mg) by mouth 2 times daily For traveler's diarrhea    Travel advice encounter       tamsulosin 0.4 MG capsule    FLOMAX    90 capsule    Take 1 capsule (0.4 mg) by mouth daily    Benign prostatic hyperplasia with weak urinary stream       typhoid CR capsule    VIVOTIF    4 capsule    Take 1 capsule by mouth every other day    Travel advice encounter

## 2018-07-09 ENCOUNTER — OFFICE VISIT (OUTPATIENT)
Dept: UROLOGY | Facility: CLINIC | Age: 48
End: 2018-07-09
Payer: COMMERCIAL

## 2018-07-09 VITALS — OXYGEN SATURATION: 97 % | BODY MASS INDEX: 27.02 KG/M2 | HEIGHT: 71 IN | HEART RATE: 72 BPM | WEIGHT: 193 LBS

## 2018-07-09 DIAGNOSIS — R97.20 ELEVATED PROSTATE SPECIFIC ANTIGEN (PSA): ICD-10-CM

## 2018-07-09 DIAGNOSIS — N40.1 BENIGN PROSTATIC HYPERPLASIA WITH WEAK URINARY STREAM: Primary | ICD-10-CM

## 2018-07-09 DIAGNOSIS — R39.12 BENIGN PROSTATIC HYPERPLASIA WITH WEAK URINARY STREAM: Primary | ICD-10-CM

## 2018-07-09 LAB — RESIDUAL VOLUME (RV) (EXTERNAL): 21

## 2018-07-09 PROCEDURE — 99204 OFFICE O/P NEW MOD 45 MIN: CPT | Performed by: UROLOGY

## 2018-07-09 ASSESSMENT — ENCOUNTER SYMPTOMS
RECTAL PAIN: 0
HEARTBURN: 0
FLANK PAIN: 0
DIARRHEA: 1
ABDOMINAL PAIN: 0
DYSURIA: 0
HEMATURIA: 0
JAUNDICE: 0
BLOOD IN STOOL: 0
BLOATING: 0
NAUSEA: 0
CONSTIPATION: 0
DIFFICULTY URINATING: 1
BOWEL INCONTINENCE: 0
VOMITING: 0

## 2018-07-09 ASSESSMENT — PAIN SCALES - GENERAL: PAINLEVEL: NO PAIN (0)

## 2018-07-09 NOTE — MR AVS SNAPSHOT
After Visit Summary   7/9/2018    Jordan Montano    MRN: [id number]           Patient Information     Date Of Birth          1970        Visit Information        Provider Department      7/9/2018 3:00 PM Tomás Mary MD [facility]         Follow-ups after your visit        Your next 10 appointments already scheduled     Jul 09, 2018  3:00 PM CDT   New Patient Visit with Tomás Mary MD   [facility] ([facility])    [address]  [address]  [facility] [address] [phone]              Who to contact     If you have questions or need follow up information about today's clinic visit or your schedule please contact [facility] directly at [phone].  Normal or non-critical lab and imaging results will be communicated to you by MyChart, letter or phone within 4 business days after the clinic has received the results. If you do not hear from us within 7 days, please contact the clinic through [employer] or phone. If you have a critical or abnormal lab result, we will notify you by phone as soon as possible.  Submit refill requests through [employer] or call your pharmacy and they will forward the refill request to us. Please allow 3 business days for your refill to be completed.          Additional Information About Your Visit        MyChart Information     [employer] gives you secure access to your electronic health record. If you see a primary care provider, you can also send messages to your care team and make appointments. If you have questions, please call your primary care clinic.  If you do not have a primary care provider, please call [phone] and they will assist you.        Care EveryWhere ID     This is your Care EveryWhere ID. This could be used by other organizations to access your [facility]  records  [id number]         Blood Pressure from Last 3 Encounters:   06/05/18 133/87   05/08/18 137/88   04/23/18 (!) 138/96    Weight from Last 3 Encounters:   06/05/18 87.5 kg (193 lb)   05/08/18 89.4 kg (197 lb)   04/23/18 92 kg (202 lb 12.8 oz)              Today, you had the following     No orders found for display       Primary Care Provider Office Phone # Fax #    Juan Ramon Staley PA-C [phone] [address]  [facility] [address]        Equal Access to Services     [facility]: Hadii aad ku hadasho Sovaughnali, waaxda luqadaha, qaybta kaalmada adeegyada, angy lorenz hayconnor salazar . So [facility] [phone].    ATENCIÓN: Si habla español, tiene a castro disposición servicios gratuitos de asistencia lingüística. [facility] [phone].    We comply with applicable federal civil rights laws and [place] laws. We do not discriminate on the basis of race, color, national origin, age, disability, sex, sexual orientation, or gender identity.            Thank you!     Thank you for choosing [facility] [place]  for your care. Our goal is always to provide you with excellent care. Hearing back from our patients is one way we can continue to improve our services. Please take a few minutes to complete the written survey that you may receive in the mail after your visit with us. Thank you!             Your Updated Medication List - Protect others around you: Learn how to safely use, store and throw away your medicines at www.disposemymeds.org.          This list is accurate as of 7/9/18  2:44 PM.  Always use your most recent med list.                   Brand Name Dispense Instructions for use Diagnosis    atovaquone-proguanil 250-100 MG per tablet    MALARONE    18 tablet    Take 1 tablet by mouth daily Start 2 days before travel and continue 7 days after return.    Travel advice encounter       ciprofloxacin 500 MG tablet    CIPRO    6 tablet     Take 1 tablet (500 mg) by mouth 2 times daily For traveler's diarrhea    Travel advice encounter       tamsulosin 0.4 MG capsule    FLOMAX    90 capsule    Take 1 capsule (0.4 mg) by mouth daily    Benign prostatic hyperplasia with weak urinary stream       typhoid CR capsule    VIVOTIF    4 capsule    Take 1 capsule by mouth every other day    Travel advice encounter

## 2018-07-09 NOTE — PROGRESS NOTES
[facility]  Main Office[address]  [address]  [place] [address]       CHIEF COMPLAINT:  Elevated PSA, enlarged prostate, history of ureteral stone    HISTORY:   I was asked by PADDY Pena, to see this 48-year-old gentleman who presents with an elevated PSA. His PSA was 4.33 in April and then on a recheck in June it was elevated further at 4.99. This workup was initiated because he had a CT scan for ureteral stone (which he eventually passed) that noted an enlarged prostate. He also complained of frequency and urgency and a slow stream. When he took Flomax for his ureteral stone he noticed an improvement in his urine stream. He remains on that medication and says that it helps him quite a bit. The PSA was checked as part of his enlarged prostate workup. He knows of no family history of prostate cancer. He has no history of gross hematuria or infections. He has no other chronic medical problems.      PAST MEDICAL HISTORY:   Past Medical History:   Diagnosis Date     NO ACTIVE PROBLEMS        PAST SURGICAL HISTORY:   Past Surgical History:   Procedure Laterality Date     L4/5 LAMINECT/DISCECTOMY, LUMBAR  2007     VASECTOMY  2005       FAMILY HISTORY:   Family History   Problem Relation Age of Onset     Family History Negative Mother      Cardiovascular Maternal Uncle      Heart bypass at age 33     Prostate Cancer Maternal Uncle      GASTROINTESTINAL DISEASE Child      celiac sprue     Diabetes No family hx of      Cancer - colorectal No family hx of        SOCIAL HISTORY:   Social History   Substance Use Topics     Smoking status: Never Smoker     Smokeless tobacco: Never Used     Alcohol use Yes      Comment: occasional social use        No Known Allergies      Current Outpatient Prescriptions:      atovaquone-proguanil (MALARONE) 250-100 MG per tablet, Take 1 tablet by mouth daily Start 2 days before travel and continue 7 days after return., Disp: 18 tablet, Rfl: 0     ciprofloxacin (CIPRO) 500  MG tablet, Take 1 tablet (500 mg) by mouth 2 times daily For traveler's diarrhea, Disp: 6 tablet, Rfl: 0     tamsulosin (FLOMAX) 0.4 MG capsule, Take 1 capsule (0.4 mg) by mouth daily, Disp: 90 capsule, Rfl: 3     typhoid (VIVOTIF) CR capsule, Take 1 capsule by mouth every other day, Disp: 4 capsule, Rfl: 0    Review Of Systems:  Skin: negative  Eyes: negative  Ears/Nose/Throat: negative  Respiratory: No shortness of breath, dyspnea on exertion, cough, or hemoptysis  Cardiovascular: negative  Gastrointestinal: negative  Genitourinary: negative  Musculoskeletal: negative  Neurologic: negative  Psychiatric: negative  Hematologic/Lymphatic/Immunologic: negative  Endocrine: negative      PHYSICAL EXAM:    There were no vitals taken for this visit.  General appearance: In NAD, conversant  HEENT: Normocephalic and atraumatic, anicteric sclera  Cardiovascular: Regular rate and rhythm without murmurs rubs or gallops  Respiratory: normal, non-labored breathing. Lungs clear to auscultation bilaterally   Gastrointestinal: negative, Abdomen soft, non-tender, and non-distended.  No prior abdominal scars  Musculoskeletal: normal musculature and movements   Peripheral Vascular/extremity: No peripheral edema  Skin: Normal temperature, turgor, and texture. No rash  Psychiatric: Appropriate affect, alert and oriented to person, place, and time    Penis: Normal  Scrotal skin: Normal, no lesions  Testicles: Normal to palpation bilaterally  Epididymis: Normal to palpation bilaterally  Lymphatic: Normal inguinal lymph nodes  Digital Rectal Exam: His prostate is moderately enlarged, benign and symmetric to palpation    Cystoscopy: Not done      PSA: 4.99    UA RESULTS:  Recent Labs   Lab Test  05/08/18   1342   COLOR  Yellow   APPEARANCE  Clear   URINEGLC  Negative   URINEBILI  Negative   URINEKETONE  40*   SG  1.010   UBLD  Trace*   URINEPH  6.0   PROTEIN  Negative   UROBILINOGEN  0.2   NITRITE  Negative   LEUKEST  Negative   RBCU  O - 2    WBCU  0 - 5       Bladder Scan: 21mL    Other Labs:      Imaging Studies: None      CLINICAL IMPRESSION:   Elevated PSA and enlarged prostate    PLAN:   He has both an enlarged prostate and an elevated PSA. Prostate is certainly enlarged for his age. We discussed treatment options for enlarged prostate and he wishes to continue the Flomax for now. We discussed the PSA, it has been elevated on 2 recent checks. I recommended a prostate biopsy. I discussed the procedure in detail with them along with its risks, including bleeding and infection. All of his questions were answered. We will get him scheduled for prostate biopsy in the office in the near future.      Tomás Mary MD    Answers for HPI/ROS submitted by the patient on 7/9/2018   General Symptoms: No  Skin Symptoms: No  HENT Symptoms: No  EYE SYMPTOMS: No  HEART SYMPTOMS: No  LUNG SYMPTOMS: No  INTESTINAL SYMPTOMS: Yes  URINARY SYMPTOMS: Yes  REPRODUCTIVE SYMPTOMS: Yes  SKELETAL SYMPTOMS: No  BLOOD SYMPTOMS: No  NERVOUS SYSTEM SYMPTOMS: No  MENTAL HEALTH SYMPTOMS: No  Heart burn or indigestion: No  Nausea: No  Vomiting: No  Abdominal pain: No  Bloating: No  Constipation: No  Diarrhea: Yes  Blood in stool: No  Black stools: No  Rectal or Anal pain: No  Fecal incontinence: No  Yellowing of skin or eyes: No  Vomit with blood: No  Change in stools: No  Trouble holding urine or incontinence: No  Pain or burning: No  Trouble starting or stopping: No  Increased frequency of urination: Yes  Blood in urine: No  Decreased frequency of urination: No  Frequent nighttime urination: Yes  Flank pain: No  Difficulty emptying bladder: Yes  Scrotal pain or swelling: No  Erectile dysfunction: No  Penile discharge: No  Genital ulcers: No  Reduced libido: No

## 2018-07-09 NOTE — LETTER
7/9/2018       RE: Jordan Montano  [address] [place] [address]     Dear Colleague,    Thank you for referring your patient, Jordan Montano, to the [facility] [place] at [facility]. Please see a copy of my visit note below.    [facility]  Main Office: [address]  [address]  [place] [address]       CHIEF COMPLAINT:  Elevated PSA, enlarged prostate, history of ureteral stone    HISTORY:   I was asked by PADDY Pena, to see this 48-year-old gentleman who presents with an elevated PSA. His PSA was 4.33 in April and then on a recheck in June it was elevated further at 4.99. This workup was initiated because he had a CT scan for ureteral stone (which he eventually passed) that noted an enlarged prostate. He also complained of frequency and urgency and a slow stream. When he took Flomax for his ureteral stone he noticed an improvement in his urine stream. He remains on that medication and says that it helps him quite a bit. The PSA was checked as part of his enlarged prostate workup. He knows of no family history of prostate cancer. He has no history of gross hematuria or infections. He has no other chronic medical problems.      PAST MEDICAL HISTORY:   Past Medical History:   Diagnosis Date     NO ACTIVE PROBLEMS        PAST SURGICAL HISTORY:   Past Surgical History:   Procedure Laterality Date     L4/5 LAMINECT/DISCECTOMY, LUMBAR  2007     VASECTOMY  2005       FAMILY HISTORY:   Family History   Problem Relation Age of Onset     Family History Negative Mother      Cardiovascular Maternal Uncle      Heart bypass at age 33     Prostate Cancer Maternal Uncle      GASTROINTESTINAL DISEASE Child      celiac sprue     Diabetes No family hx of      Cancer - colorectal No family hx of        SOCIAL HISTORY:   Social History   Substance Use Topics     Smoking status: Never Smoker     Smokeless tobacco: Never Used      Alcohol use Yes      Comment: occasional social use        No Known Allergies      Current Outpatient Prescriptions:      atovaquone-proguanil (MALARONE) 250-100 MG per tablet, Take 1 tablet by mouth daily Start 2 days before travel and continue 7 days after return., Disp: 18 tablet, Rfl: 0     ciprofloxacin (CIPRO) 500 MG tablet, Take 1 tablet (500 mg) by mouth 2 times daily For traveler's diarrhea, Disp: 6 tablet, Rfl: 0     tamsulosin (FLOMAX) 0.4 MG capsule, Take 1 capsule (0.4 mg) by mouth daily, Disp: 90 capsule, Rfl: 3     typhoid (VIVOTIF) CR capsule, Take 1 capsule by mouth every other day, Disp: 4 capsule, Rfl: 0    Review Of Systems:  Skin: negative  Eyes: negative  Ears/Nose/Throat: negative  Respiratory: No shortness of breath, dyspnea on exertion, cough, or hemoptysis  Cardiovascular: negative  Gastrointestinal: negative  Genitourinary: negative  Musculoskeletal: negative  Neurologic: negative  Psychiatric: negative  Hematologic/Lymphatic/Immunologic: negative  Endocrine: negative      PHYSICAL EXAM:    There were no vitals taken for this visit.  General appearance: In NAD, conversant  HEENT: Normocephalic and atraumatic, anicteric sclera  Cardiovascular: Regular rate and rhythm without murmurs rubs or gallops  Respiratory: normal, non-labored breathing. Lungs clear to auscultation bilaterally   Gastrointestinal: negative, Abdomen soft, non-tender, and non-distended.  No prior abdominal scars  Musculoskeletal: normal musculature and movements   Peripheral Vascular/extremity: No peripheral edema  Skin: Normal temperature, turgor, and texture. No rash  Psychiatric: Appropriate affect, alert and oriented to person, place, and time    Penis: Normal  Scrotal skin: Normal, no lesions  Testicles: Normal to palpation bilaterally  Epididymis: Normal to palpation bilaterally  Lymphatic: Normal inguinal lymph nodes  Digital Rectal Exam: His prostate is moderately enlarged, benign and symmetric to  palpation    Cystoscopy: Not done      PSA: 4.99    UA RESULTS:  Recent Labs   Lab Test  05/08/18   1342   COLOR  Yellow   APPEARANCE  Clear   URINEGLC  Negative   URINEBILI  Negative   URINEKETONE  40*   SG  1.010   UBLD  Trace*   URINEPH  6.0   PROTEIN  Negative   UROBILINOGEN  0.2   NITRITE  Negative   LEUKEST  Negative   RBCU  O - 2   WBCU  0 - 5       Bladder Scan: 21mL    Other Labs:      Imaging Studies: None      CLINICAL IMPRESSION:   Elevated PSA and enlarged prostate    PLAN:   He has both an enlarged prostate and an elevated PSA. Prostate is certainly enlarged for his age. We discussed treatment options for enlarged prostate and he wishes to continue the Flomax for now. We discussed the PSA, it has been elevated on 2 recent checks. I recommended a prostate biopsy. I discussed the procedure in detail with them along with its risks, including bleeding and infection. All of his questions were answered. We will get him scheduled for prostate biopsy in the office in the near future.      Tomás Mary MD

## 2018-07-10 RX ORDER — CIPROFLOXACIN 500 MG/1
500 TABLET, FILM COATED ORAL 2 TIMES DAILY
Qty: 6 TABLET | Refills: 0 | Status: SHIPPED | OUTPATIENT
Start: 2018-07-10 | End: 2019-02-25

## 2018-08-14 ENCOUNTER — OFFICE VISIT (OUTPATIENT)
Dept: UROLOGY | Facility: CLINIC | Age: 48
End: 2018-08-14
Payer: COMMERCIAL

## 2018-08-14 VITALS
HEART RATE: 80 BPM | BODY MASS INDEX: 26.6 KG/M2 | HEIGHT: 71 IN | DIASTOLIC BLOOD PRESSURE: 82 MMHG | WEIGHT: 190 LBS | SYSTOLIC BLOOD PRESSURE: 132 MMHG | OXYGEN SATURATION: 98 %

## 2018-08-14 DIAGNOSIS — R97.20 ELEVATED PROSTATE SPECIFIC ANTIGEN (PSA): Primary | ICD-10-CM

## 2018-08-14 PROCEDURE — 55700 ZZHC BIOPSY PROSTATE NEEDLE/PUNCH: CPT | Performed by: UROLOGY

## 2018-08-14 PROCEDURE — 88305 TISSUE EXAM BY PATHOLOGIST: CPT | Performed by: UROLOGY

## 2018-08-14 PROCEDURE — 76872 US TRANSRECTAL: CPT | Performed by: UROLOGY

## 2018-08-14 ASSESSMENT — PAIN SCALES - GENERAL
PAINLEVEL: NO PAIN (0)
PAINLEVEL: NO PAIN (0)

## 2018-08-14 NOTE — NURSING NOTE
Chief Complaint   Patient presents with     Elevated PSA     Patient here for Trus with BX         Pre-Operative    Consent read and signed: Yes   No Known Allergies  Pre-operative antibiotics taken: Yes  Aspirin or other blood thinning medications not taken in 7-10 days:  Yes  Time of Fleet's enema: 12:00PM      Patient was alert and Oriented x3  Every thing went well and the patient was giving   Post Operative Information. He has a return visit  To talk about his result's.    The following medication was given:     MEDICATION:  Lidocaine 2% Soln  ROUTE: Rectal  SITE: Prostate  DOSE: 15  LOT #: [id number]  : [id number]  EXPIRATION DATE: 07/01/2018  NDC#: 3440-0311-31   Was there drug waste? Yes  Amount of drug waste (mL): 5.  Reason for waste:  Single use vial  Multi-dose vial: Minal Oconnor CMA  August 14, 2018

## 2018-08-14 NOTE — LETTER
8/14/2018       RE: Jordan Montano  [address] [place] [address]     Dear Colleague,    Thank you for referring your patient, Jordan Montano, to the [facility] [place] at [facility]. Please see a copy of my visit note below.    Jordan Montano is here for a transrectal altrasound guided needle biopsy of the prostate for a significant risk of potentially lethal prostate cancer.    The risks, benefits, of the procudure were discussed.  All questions were answered.  A written informed consent was obtained.      PSA   Date Value Ref Range Status   06/04/2018 4.99 (H) 0 - 4 ug/L Final     Comment:     Assay Method:  Chemiluminescence using Siemens Vista analyzer   04/23/2018 4.33 (H) 0 - 4 ug/L Final     Comment:     Assay Method:  Chemiluminescence using Siemens Vista analyzer       An enema was completed and 500 mg of Cipro twice daily was started prior to the biopsy.  After obtaining informed consent patient was placed in lateral decubitus position.  The ultrasound probe was placed in the rectum.  The prostate was numbed using ultrasound guidance with 1% lidocaine 5 mls along each nerve bundle.      The volume was measured and estimated to be 52 cubic centimeters.      US images were used to guide the biopsies of the prostate.  12 cores were taken with 6 on each side, 2 at the base,  2 at the midgland and  2 at the apex.  The patient tolerated the procedure well.      We will follow up with the results in 7-10 days and contact patient with these results.        Again, thank you for allowing me to participate in the care of your patient.      Sincerely,    Tomás Mary MD

## 2018-08-14 NOTE — PATIENT INSTRUCTIONS
[facility]  Transrectal Ultrasound  Post Operative Information    The physician who performed your Transrectal Ultrasound is Dr Mary (telephone number [phone]).  Please contact this doctor if you have any problems or questions.  If unable to reach your doctor, please return to the Emergency Department.      Take one antibiotic the evening of the procedure and then as directed on your prescription.    Drink at least 6-8 glasses of fluids for the first 48 hours.    Avoid heavy lifting and strenuous activity for 48 hours.    Avoid sexual intercourse for the first 24 hours.    No aspirin or ibuprofen products (Motrin, Advil, Nuprin, ect.) for one week.  You may take acetaminophen (Tylenol) for pain.    You may notice a small amount of blood on the tissue after a bowel movement.    You may pass blood with clots in your urine following the procedure.  The amount will decrease with time but may be visible for up to two weeks.     You make have blood in your semen for 4 weeks after the procedure.    You may experience mild perineal (groin area) discomfort after the procedure.    Please call you doctor if you have any of the follow symptoms:  Fever  Increase in the amount of blood passed  Severe discomfort or pain

## 2018-08-14 NOTE — MR AVS SNAPSHOT
After Visit Summary   8/14/2018    Jordan Montano    MRN: [id number]           Patient Information     Date Of Birth          1970        Visit Information        Provider Department      8/14/2018 2:00 PM Tomás Mary MD; UA BX ROOM [facility] [place]        Today's Diagnoses     Elevated prostate specific antigen (PSA)    -  1      Care Instructions    [facility]  Transrectal Ultrasound  Post Operative Information    The physician who performed your Transrectal Ultrasound is Dr Mary (telephone number [phone]).  Please contact this doctor if you have any problems or questions.  If unable to reach your doctor, please return to the Emergency Department.      Take one antibiotic the evening of the procedure and then as directed on your prescription.    Drink at least 6-8 glasses of fluids for the first 48 hours.    Avoid heavy lifting and strenuous activity for 48 hours.    Avoid sexual intercourse for the first 24 hours.    No aspirin or ibuprofen products (Motrin, Advil, Nuprin, ect.) for one week.  You may take acetaminophen (Tylenol) for pain.    You may notice a small amount of blood on the tissue after a bowel movement.    You may pass blood with clots in your urine following the procedure.  The amount will decrease with time but may be visible for up to two weeks.     You make have blood in your semen for 4 weeks after the procedure.    You may experience mild perineal (groin area) discomfort after the procedure.    Please call you doctor if you have any of the follow symptoms:  Fever  Increase in the amount of blood passed  Severe discomfort or pain            Follow-ups after your visit        Your next 10 appointments already scheduled     Aug 27, 2018  3:30 PM CDT   Return Visit with Tomás Mary MD   [facility] ([facility])    [address]  [address]  [facility] [id number] [phone]              Who to contact     If you have questions or need follow up information about today's clinic visit or your schedule please contact [facility] [place] directly at [phone].  Normal or non-critical lab and imaging results will be communicated to you by MyChart, letter or phone within 4 business days after the clinic has received the results. If you do not hear from us within 7 days, please contact the clinic through [facility] or phone. If you have a critical or abnormal lab result, we will notify you by phone as soon as possible.  Submit refill requests through [facility] or call your pharmacy and they will forward the refill request to us. Please allow 3 business days for your refill to be completed.          Additional Information About Your Visit        MyChart Information     [facility] gives you secure access to your electronic health record. If you see a primary care provider, you can also send messages to your care team and make appointments. If you have questions, please call your primary care clinic.  If you do not have a primary care provider, please call [phone] and they will assist you.        Care EveryWhere ID     This is your Care EveryWhere ID. This could be used by other organizations to access your [place] medical records  [id number]        Your Vitals Were     Pulse Height Pulse Oximetry BMI (Body Mass Index)          80 1.803 m (5' 11\") 98% 26.5 kg/m2         Blood Pressure from Last 3 Encounters:   08/14/18 132/82   06/05/18 133/87   05/08/18 137/88    Weight from Last 3 Encounters:   08/14/18 86.2 kg (190 lb)   07/09/18 87.5 kg (193 lb)   06/05/18 87.5 kg (193 lb)              Today, you had the following     No orders found for display       Primary Care Provider Office Phone # Fax #    Juan Ramon Staley PA-C [phone]       [address] [place] [address]      "   Equal Access to Services     [facility] : Hadii aad ku hadalexandernaima Niniali, waaxda luqadaha, qaybta kaalmasavanna andre, angy teresa. So [facility] [phone].    ATENCIÓN: Si habla español, tiene a castro disposición servicios gratuitos de asistencia lingüística. Kaylee al [phone].    We comply with applicable federal civil rights laws and [place] laws. We do not discriminate on the basis of race, color, national origin, age, disability, sex, sexual orientation, or gender identity.            Thank you!     Thank you for choosing [facility] [place]  for your care. Our goal is always to provide you with excellent care. Hearing back from our patients is one way we can continue to improve our services. Please take a few minutes to complete the written survey that you may receive in the mail after your visit with us. Thank you!             Your Updated Medication List - Protect others around you: Learn how to safely use, store and throw away your medicines at www.disposemymeds.org.          This list is accurate as of 8/14/18  2:30 PM.  Always use your most recent med list.                   Brand Name Dispense Instructions for use Diagnosis    atovaquone-proguanil 250-100 MG per tablet    MALARONE    18 tablet    Take 1 tablet by mouth daily Start 2 days before travel and continue 7 days after return.    Travel advice encounter       * ciprofloxacin 500 MG tablet    CIPRO    6 tablet    Take 1 tablet (500 mg) by mouth 2 times daily For traveler's diarrhea    Travel advice encounter       * ciprofloxacin 500 MG tablet    CIPRO    6 tablet    Take 1 tablet (500 mg) by mouth 2 times daily    Elevated prostate specific antigen (PSA)       tamsulosin 0.4 MG capsule    FLOMAX    90 capsule    Take 1 capsule (0.4 mg) by mouth daily    Benign prostatic hyperplasia with weak urinary stream       typhoid CR capsule    VIVOTIF    4 capsule    Take 1 capsule by mouth  every other day    Travel advice encounter       * Notice:  This list has 2 medication(s) that are the same as other medications prescribed for you. Read the directions carefully, and ask your doctor or other care provider to review them with you.

## 2018-08-14 NOTE — PROGRESS NOTES
Jordan JUSTIN Richiereba is here for a transrectal altrasound guided needle biopsy of the prostate for a significant risk of potentially lethal prostate cancer.    The risks, benefits, of the procudure were discussed.  All questions were answered.  A written informed consent was obtained.      PSA   Date Value Ref Range Status   06/04/2018 4.99 (H) 0 - 4 ug/L Final     Comment:     Assay Method:  Chemiluminescence using Siemens Vista analyzer   04/23/2018 4.33 (H) 0 - 4 ug/L Final     Comment:     Assay Method:  Chemiluminescence using Siemens Vista analyzer       An enema was completed and 500 mg of Cipro twice daily was started prior to the biopsy.  After obtaining informed consent patient was placed in lateral decubitus position.  The ultrasound probe was placed in the rectum.  The prostate was numbed using ultrasound guidance with 1% lidocaine 5 mls along each nerve bundle.      The volume was measured and estimated to be 52 cubic centimeters.      US images were used to guide the biopsies of the prostate.  12 cores were taken with 6 on each side, 2 at the base,  2 at the midgland and  2 at the apex.  The patient tolerated the procedure well.      We will follow up with the results in 7-10 days and contact patient with these results.

## 2018-08-16 LAB — COPATH REPORT: NORMAL

## 2018-08-17 ENCOUNTER — TELEPHONE (OUTPATIENT)
Dept: UROLOGY | Facility: CLINIC | Age: 48
End: 2018-08-17

## 2018-08-17 DIAGNOSIS — R97.20 ELEVATED PROSTATE SPECIFIC ANTIGEN (PSA): Primary | ICD-10-CM

## 2019-02-18 ENCOUNTER — MYC MEDICAL ADVICE (OUTPATIENT)
Dept: FAMILY MEDICINE | Facility: CLINIC | Age: 49
End: 2019-02-18

## 2019-02-20 ENCOUNTER — OFFICE VISIT (OUTPATIENT)
Dept: UROLOGY | Facility: CLINIC | Age: 49
End: 2019-02-20
Payer: COMMERCIAL

## 2019-02-20 VITALS — WEIGHT: 190 LBS | OXYGEN SATURATION: 96 % | BODY MASS INDEX: 26.6 KG/M2 | HEIGHT: 71 IN | HEART RATE: 86 BPM

## 2019-02-20 DIAGNOSIS — R97.20 ELEVATED PROSTATE SPECIFIC ANTIGEN (PSA): Primary | ICD-10-CM

## 2019-02-20 PROCEDURE — 99213 OFFICE O/P EST LOW 20 MIN: CPT | Performed by: UROLOGY

## 2019-02-20 ASSESSMENT — PAIN SCALES - GENERAL: PAINLEVEL: NO PAIN (0)

## 2019-02-20 ASSESSMENT — MIFFLIN-ST. JEOR: SCORE: 1748.96

## 2019-02-20 NOTE — PROGRESS NOTES
Office Visit Note  [facility]  [phone]    UROLOGIC DIAGNOSES:   Elevated PSA, enlarged prostate, history of ureteral stone    CURRENT INTERVENTIONS:   Negative prostate biopsy in August 2018,, Flomax    HISTORY:   Jordan returns to clinic today for follow-up. He had a negative prostate biopsy in August that showed the prostate to be enlarged at 52 cm . He has not yet had his PSA rechecked since that time. He currently has no urinary symptoms or complaints while he is on Flomax. He does notice a diminishment of his urinary stream if he forgets to take the medication.      PAST MEDICAL HISTORY:   Past Medical History:   Diagnosis Date     NO ACTIVE PROBLEMS        PAST SURGICAL HISTORY:   Past Surgical History:   Procedure Laterality Date     L4/5 LAMINECT/DISCECTOMY, LUMBAR  2007     PROSTATE SURGERY  08/14/2018    TRUS WITH BX     TESTICLE SURGERY       VASECTOMY  2005     VASECTOMY         FAMILY HISTORY:   Family History   Problem Relation Age of Onset     Family History Negative Mother      Cardiovascular Maternal Uncle         Heart bypass at age 33     Prostate Cancer Maternal Uncle      Gastrointestinal Disease Child         celiac sprue     Diabetes No family hx of      Cancer - colorectal No family hx of        SOCIAL HISTORY:   Social History     Tobacco Use     Smoking status: Never Smoker     Smokeless tobacco: Never Used   Substance Use Topics     Alcohol use: Yes     Comment: occasional social use       Current Outpatient Medications   Medication     atovaquone-proguanil (MALARONE) 250-100 MG per tablet     ciprofloxacin (CIPRO) 500 MG tablet     ciprofloxacin (CIPRO) 500 MG tablet     tamsulosin (FLOMAX) 0.4 MG capsule     typhoid (VIVOTIF) CR capsule     No current facility-administered medications for this visit.          PHYSICAL EXAM:    There were no vitals taken for this visit.    Constitutional: Well developed. Conversant and in no acute distress  Eyes: Anicteric sclera,  conjunctiva clear, normal extraocular movements  ENT: Normocephalic and atraumatic,   Skin: Warm and dry. No rashes or lesions  Cardiac: No peripheral edema  Back/Flank: Not done  CNS/PNS: Normal musculature and movements, moves all extremities normally  Respiratory: Normal non-labored breathing  Abdomen: Soft nontender and nondistended  Peripheral Vascular: No peripheral edema  Mental Status/Psych: Alert and Oriented x 3. Normal mood and affect    Penis: Not done  Scrotal Skin: Not done  Testicles: Not done  Epididymis: Not done  Digital Rectal Exam: his prostate is moderately enlarged,  Benign and symmetric to palpation    Cystoscopy: Not done    Imaging: None    Urinalysis: UA RESULTS:  Recent Labs   Lab Test 05/08/18  1342   COLOR Yellow   APPEARANCE Clear   URINEGLC Negative   URINEBILI Negative   URINEKETONE 40*   SG 1.010   UBLD Trace*   URINEPH 6.0   PROTEIN Negative   UROBILINOGEN 0.2   NITRITE Negative   LEUKEST Negative   RBCU O - 2   WBCU 0 - 5       PSA:     Post Void Residual:     Other labs: None today      IMPRESSION:  Enlarged prostate, elevated PSA    PLAN:  His examination is benign. He has not yet had his PSA rechecked since his biopsy but has plans to do so on Monday.  I reminded him to the PSA checked on Monday, if it is unchanged at that time we will see him back again in 6 months for another PSA and exam.    Total Time: 15 minutes                                      Total in Consultation: 15 minutes      Tomás Mary M.D.

## 2019-02-21 NOTE — PROGRESS NOTES
"  SUBJECTIVE:   Jordan Montano is a 48 year old male who presents to clinic today for the following health issues:    Patient is here to discuss getting a referral for sleep study for possible sleep apnea.  He notes that snoring is getting out of control; embarrassing even  On a recent trip many of his friends noted how loud it was  No witnessed apneas but does wake himself up at night  Snoring for years, now getting worse  Does feel daytime drowsiness  No waking with a HA     Pt also needs to complete a PSA test  He had NEG biopsy earlier in 2018.  No NEW urnary symptoms        Problem list and histories reviewed & adjusted, as indicated.  Additional history: as documented    Patient Active Problem List   Diagnosis     Cardiac Risk Factors     Lumbar Degenerative Disc Dz     CARDIOVASCULAR SCREENING; LDL GOAL LESS THAN 160     BPH (benign prostatic hyperplasia)     Past Surgical History:   Procedure Laterality Date     L4/5 LAMINECT/DISCECTOMY, LUMBAR  2007     PROSTATE SURGERY  08/14/2018    TRUS WITH BX     TESTICLE SURGERY       VASECTOMY  2005     VASECTOMY         Social History     Tobacco Use     Smoking status: Never Smoker     Smokeless tobacco: Never Used   Substance Use Topics     Alcohol use: Yes     Comment: occasional social use     Family History   Problem Relation Age of Onset     Family History Negative Mother      Cardiovascular Maternal Uncle         Heart bypass at age 33     Prostate Cancer Maternal Uncle      Gastrointestinal Disease Child         celiac sprue     Diabetes No family hx of      Cancer - colorectal No family hx of            Reviewed and updated as needed this visit by clinical staff       Reviewed and updated as needed this visit by Provider         ROS:  Constitutional, HEENT, cardiovascular, pulmonary, gi and gu systems are negative, except as otherwise noted.    OBJECTIVE:     /82   Pulse 66   Temp 97  F (36.1  C) (Tympanic)   Resp 16   Ht 1.803 m (5' 11\")   Wt " 89.6 kg (197 lb 8 oz)   SpO2 99%   BMI 27.55 kg/m    Body mass index is 27.55 kg/m .  GENERAL: healthy, alert and no distress  HENT: uvula mildly elongated  RESP: lungs clear to auscultation - no rales, rhonchi or wheezes  CV: regular rate and rhythm, normal S1 S2, no S3 or S4, no murmur, click or rub, no peripheral edema and peripheral pulses strong  RECTAL (male): deferred    Diagnostic Test Results:  See A/P    ASSESSMENT/PLAN:   1. Snoring  Referral placed.  - SLEEP EVALUATION & MANAGEMENT REFERRAL - ADULT -[facility]  [phone] (Age 18 and up); Future    2. Elevated prostate specific antigen (PSA)  Continue on flomax. Updating PSA as per Dr. Mary  - PSA tumor marker    Juan Ramon Staley PA-C  [facility]

## 2019-02-25 ENCOUNTER — OFFICE VISIT (OUTPATIENT)
Dept: FAMILY MEDICINE | Facility: CLINIC | Age: 49
End: 2019-02-25
Payer: COMMERCIAL

## 2019-02-25 VITALS
BODY MASS INDEX: 27.65 KG/M2 | HEART RATE: 66 BPM | WEIGHT: 197.5 LBS | RESPIRATION RATE: 16 BRPM | SYSTOLIC BLOOD PRESSURE: 122 MMHG | DIASTOLIC BLOOD PRESSURE: 82 MMHG | HEIGHT: 71 IN | TEMPERATURE: 97 F | OXYGEN SATURATION: 99 %

## 2019-02-25 DIAGNOSIS — R06.83 SNORING: Primary | ICD-10-CM

## 2019-02-25 DIAGNOSIS — R97.20 ELEVATED PROSTATE SPECIFIC ANTIGEN (PSA): ICD-10-CM

## 2019-02-25 PROCEDURE — 36415 COLL VENOUS BLD VENIPUNCTURE: CPT | Performed by: UROLOGY

## 2019-02-25 PROCEDURE — 99213 OFFICE O/P EST LOW 20 MIN: CPT | Performed by: PHYSICIAN ASSISTANT

## 2019-02-25 PROCEDURE — 84153 ASSAY OF PSA TOTAL: CPT | Performed by: UROLOGY

## 2019-02-25 ASSESSMENT — MIFFLIN-ST. JEOR: SCORE: 1782.98

## 2019-02-27 LAB — PSA SERPL-MCNC: 2.57 UG/L (ref 0–4)

## 2019-02-28 ENCOUNTER — TELEPHONE (OUTPATIENT)
Dept: UROLOGY | Facility: CLINIC | Age: 49
End: 2019-02-28

## 2019-03-05 ENCOUNTER — MYC MEDICAL ADVICE (OUTPATIENT)
Dept: FAMILY MEDICINE | Facility: CLINIC | Age: 49
End: 2019-03-05

## 2019-04-03 ENCOUNTER — OFFICE VISIT (OUTPATIENT)
Dept: SLEEP MEDICINE | Facility: CLINIC | Age: 49
End: 2019-04-03
Attending: PHYSICIAN ASSISTANT
Payer: COMMERCIAL

## 2019-04-03 VITALS
WEIGHT: 198 LBS | RESPIRATION RATE: 16 BRPM | DIASTOLIC BLOOD PRESSURE: 79 MMHG | BODY MASS INDEX: 27.72 KG/M2 | HEART RATE: 66 BPM | HEIGHT: 71 IN | SYSTOLIC BLOOD PRESSURE: 125 MMHG | OXYGEN SATURATION: 97 %

## 2019-04-03 DIAGNOSIS — G47.30 OBSERVED SLEEP APNEA: ICD-10-CM

## 2019-04-03 DIAGNOSIS — R06.83 SNORING: Primary | ICD-10-CM

## 2019-04-03 PROCEDURE — 99244 OFF/OP CNSLTJ NEW/EST MOD 40: CPT | Performed by: PHYSICIAN ASSISTANT

## 2019-04-03 ASSESSMENT — MIFFLIN-ST. JEOR: SCORE: 1780.25

## 2019-04-03 NOTE — NURSING NOTE
"Chief Complaint   Patient presents with     Sleep Problem     Excessive snoring       Initial /79   Pulse 66   Resp 16   Ht 1.803 m (5' 11\")   Wt 89.8 kg (198 lb)   SpO2 97%   BMI 27.62 kg/m   Estimated body mass index is 27.62 kg/m  as calculated from the following:    Height as of this encounter: 1.803 m (5' 11\").    Weight as of this encounter: 89.8 kg (198 lb).    Medication Reconciliation: complete     ESS 13  Neck 39cm  Sera Feldman        "

## 2019-04-03 NOTE — PATIENT INSTRUCTIONS
"MY TREATMENT INFORMATION FOR SLEEP APNEA-  Jordan Montano    DOCTOR : Bennett Ezra Goltz, PA-C  SLEEP CENTER : [facility]     MY CONTACT NUMBER: [phone]    Am I having a sleep study at a sleep center?  Make sure you have an appointment for the study before you leave!    Am I having a home sleep study?  Watch this video:  [email or web address]?[email or web address]_[email or web address]  Please verify your insurance coverage with your insurance carrier    Frequently asked questions:  1. What is Obstructive Sleep Apnea (ROSHNI)? ROSHNI is the most common type of sleep apnea. Apnea means, \"without breath.\"  Apnea is most often caused by narrowing or collapse of the upper airway as muscles relax during sleep.   Almost everyone has occasional apneas. Most people with sleep apnea have had brief interruptions at night frequently for many years.  The severity of sleep apnea is related to how frequent and severe the events are.   2. What are the consequences of ROSHNI? Symptoms include: feeling sleepy during the day, snoring loudly, gasping or stopping of breathing, trouble sleeping, and occasionally morning headaches or heartburn at night.  Sleepiness can be serious and even increase the risk of falling asleep while driving. Other health consequences may include development of high blood pressure and other cardiovascular disease in persons who are susceptible. Untreated ROSHNI  can contribute to heart disease, stroke and diabetes.   3. What are the treatment options? In most situations, sleep apnea is a lifelong disease that must be managed with daily therapy. Medications are not effective for sleep apnea and surgery is generally not considered until other therapies have been tried. Your treatment is your choice . Continuous Positive Airway (CPAP) works right away and is the therapy that is effective in nearly everyone. An oral device to hold your jaw forward is usually the next most reliable option. Other options " include postioning devices (to keep you off your back), weight loss, and surgery including a tongue pacing device. There is more detail about some of these options below.    Important tips for using CPAP and similar devices   Know your equipment:  CPAP is continuous positive airway pressure that prevents obstructive sleep apnea by keeping the throat from collapsing while you are sleeping. In most cases, the device is  smart  and can slowly self-adjusts if your throat collapses and keeps a record every day of how well you are treated-this information is available to you and your care team.  BPAP is bilevel positive airway pressure that keeps your throat open and also assists each breath with a pressure boost to maintain adequate breathing.  Special kinds of BPAP are used in patients who have inadequate breathing from lung or heart disease. In most cases, the device is  smart  and can slowly self-adjusts to assist breathing. Like CPAP, the device keeps a record of how well you are treated.  Your mask is your connection to the device. You get to choose what feels most comfortable and the staff will help to make sure if fits. Here: are some examples of the different masks that are available:       Key points to remember on your journey with sleep apnea:  1. Sleep study.  PAP devices often need to be adjusted during a sleep study to show that they are effective and adjusted right.  2. Good tips to remember: Try wearing just the mask during a quiet time during the day so your body adapts to wearing it. A humidifier is recommended for comfort in most cases to prevent drying of your nose and throat. Allergy medication from your provider may help you if you are having nasal congestion.  3. Getting settled-in. It takes more than one night for most of us to get used to wearing a mask. Try wearing just the mask during a quiet time during the day so your body adapts to wearing it. A humidifier is recommended for comfort in most  cases. Our team will work with you carefully on the first day and will be in contact within 4 days and again at 2 and 4 weeks for advice and remote device adjustments. Your therapy is evaluated by the device each day.   4. Use it every night. The more you are able to sleep naturally for 7-8 hours, the more likely you will have good sleep and to prevent health risks or symptoms from sleep apnea. Even if you use it 4 hours it helps. Occasionally all of us are unable to use a medical therapy, in sleep apnea, it is not dangerous to miss one night.   5. Communicate. Call our skilled team on the number provided on the first day if your visit for problems that make it difficult to wear the device. Over 2 out of 3 patients can learn to wear the device long-term with help from our team. Remember to call our team or your sleep providers if you are unable to wear the device as we may have other solutions for those who cannot adapt to mask CPAP therapy. It is recommended that you sleep your sleep provider within the first 3 months and yearly after that if you are not having problems.   Take care of your equipment. Make sure you clean your mask and tubing using directions every day and that your filter and mask are replaced as recommended or if they are not working.     BESIDES CPAP, WHAT OTHER THERAPIES ARE THERE?    Positioning Device  Positioning devices are generally used when sleep apnea is mild and only occurs on your back.This example shows a pillow that straps around the waist. It may be appropriate for those whose sleep study shows milder sleep apnea that occurs primarily when lying flat on one's back. Preliminary studies have shown benefit but effectiveness at home may need to be verified by a home sleep test. These devices are generally not covered by medical insurance.  Examples of devices that maintain sleeping on the back to prevent snoring and mild sleep apnea.    Belt type body  positioner  Http://[employer].Posto7/    Electronic reminder  Http://nightshifttherapy.com/  Http://www.[employer].com.au/    Oral Appliance  What is oral appliance therapy?  An oral appliance device fits on your teeth at night like a retainer used after having braces. The device is made by a specialized dentist and requires several visits over 1-2 months before a manufactured device is made to fit your teeth and is adjusted to prevent your sleep apnea. Once an oral device is working properly, snoring should be improved. A home sleep test may be recommended at that time if to determine whether the sleep apnea is adequately treated.       Some things to remember:  -Oral devices are often, but not always, covered by your medical insurance. Be sure to check with your insurance provider.   -If you are referred for oral therapy, you will be given a list of specialized dentists to consider or you may choose to visit the Web site of the American Academy of Dental Sleep Medicine  -Oral devices are less likely to work if you have severe sleep apnea or are extremely overweight.     More detailed information  An oral appliance is a small acrylic device that fits over the upper and lower teeth  (similar to a retainer or a mouth guard). This device slightly moves jaw forward, which moves the base of the tongue forward, opens the airway, improves breathing for effective treat snoring and obstructive sleep apnea in perhaps 7 out of 10 people .  The best working devices are custom-made by a dental device  after a mold is made of the teeth 1, 2, 3.  When is an oral appliance indicated?  Oral appliance therapy is recommended as a first-line treatment for patients with primary snoring, mild sleep apnea, and for patients with moderate sleep apnea who prefer appliance therapy to use of CPAP4, 5. Severity of sleep apnea is determined by sleep testing and is based on the number of respiratory events per hour of sleep.   How successful  is oral appliance therapy?  The success rate of oral appliance therapy in patients with mild sleep apnea is 75-80% while in patients with moderate sleep apnea it is 50-70%. The chance of success in patients with severe sleep apnea is 40-50%. The research also shows that oral appliances have a beneficial effect on the cardiovascular health of ROSHNI patients at the same magnitude as CPAP therapy7.  Oral appliances should be a second-line treatment in cases of severe sleep apnea, but if not completely successful then a combination therapy utilizing CPAP plus oral appliance therapy may be effective. Oral appliances tend to be effective in a broad range of patients although studies show that the patients who have the highest success are females, younger patients, those with milder disease, and less severe obesity. 3, 6.   Finding a dentist that practices dental sleep medicine  Specific training is available through the American Academy of Dental Sleep Medicine for dentists interested in working in the field of sleep. To find a dentist who is educated in the field of sleep and the use of oral appliances, near you, visit the Web site of the American Academy of Dental Sleep Medicine.    References  1. Maru et al. Objectively measured vs self-reported compliance during oral appliance therapy for sleep-disordered breathing. Chest 2013; 144(5): 8099-3236.  2. Carson et al. Objective measurement of compliance during oral appliance therapy for sleep-disordered breathing. Thorax 2013; 68(1): 91-96.  3. Nakita, et al. Mandibular advancement devices in 620 men and women with ROSHNI and snoring: tolerability and predictors of treatment success. Chest 2004; 125: 4471-9831.  4. Padmini, et al. Oral appliances for snoring and ROSHNI: a review. Sleep 2006; 29: 244-262.  5. Adams, et al. Oral appliance treatment for ROSHNI: an update. J Clin Sleep Med 2014; 10(2): 215-227.  6. Boby et al. Predictors of OSAH treatment  outcome. J [place] Res 2007; 86: 9822-4261.    Weight Loss:    Weight loss is a long-term strategy that may improve sleep apnea in some patients.    Weight management is a personal decision and the decision should be based on your interest and the potential benefits.  If you are interested in exploring weight loss strategies, the following discussion covers the impact on weight loss on sleep apnea and the approaches that may be successful.    Being overweight does not necessarily mean you will have health consequences.  Those who have BMI over 35 or over 27 with existing medical conditions carries greater risk.   Weight loss decreases severity of sleep apnea in most people with obesity. For those with mild obesity who have developed snoring with weight gain, even 15-30 pound weight loss can improve and occasionally eliminate sleep apnea.  Structured and life-long dietary and health habits are necessary to lose weight and keep healthier weight levels.     Though there may be significant health benefits from weight loss, long-term weight loss is very difficult to achieve- studies show success with dietary management in less than 10% of people. In addition, substantial weight loss may require years of dietary control and may be difficult if patients have severe obesity. In these cases, surgical management may be considered.  Finally, older individuals who have tolerated obesity without health complications may be less likely to benefit from weight loss strategies.      Your BMI is Body mass index is 27.62 kg/m .  Weight management is a personal decision.  If you are interested in exploring weight loss strategies, the following discussion covers the approaches that may be successful. Body mass index (BMI) is one way to tell whether you are at a healthy weight, overweight, or obese. It measures your weight in relation to your height.  A BMI of 18.5 to 24.9 is in the healthy range. A person with a BMI of 25 to 29.9 is  considered overweight, and someone with a BMI of 30 or greater is considered obese. More than two-thirds of American adults are considered overweight or obese.  Being overweight or obese increases the risk for further weight gain. Excess weight may lead to heart disease and diabetes.  Creating and following plans for healthy eating and physical activity may help you improve your health.  Weight control is part of healthy lifestyle and includes exercise, emotional health, and healthy eating habits. Careful eating habits lifelong are the mainstay of weight control. Though there are significant health benefits from weight loss, long-term weight loss with diet alone may be very difficult to achieve- studies show long-term success with dietary management in less than 10% of people. Attaining a healthy weight may be especially difficult to achieve in those with severe obesity. In some cases, medications, devices and surgical management might be considered.  What can you do?  If you are overweight or obese and are interested in methods for weight loss, you should discuss this with your provider.     Consider reducing daily calorie intake by 500 calories.     Keep a food journal.     Avoiding skipping meals, consider cutting portions instead.    Diet combined with exercise helps maintain muscle while optimizing fat loss. Strength training is particularly important for building and maintaining muscle mass. Exercise helps reduce stress, increase energy, and improves fitness. Increasing exercise without diet control, however, may not burn enough calories to loose weight.       Start walking three days a week 10-20 minutes at a time    Work towards walking thirty minutes five days a week     Eventually, increase the speed of your walking for 1-2 minutes at time    In addition, we recommend that you review healthy lifestyles and methods for weight loss available through the National Institutes of Health patient information  sites:  http://win.niddk.nih.gov/publications/index.htm    And look into health and wellness programs that may be available through your health insurance provider, employer, local community center, or meg club.    Weight management plan: Patient was referred to their PCP to discuss a diet and exercise plan.    Surgery:  Surgery for obstructive sleep apnea is considered generally only when other therapies fail to work. Surgery may be discussed with you if you are having a difficult time tolerating CPAP and or when there is an abnormal structure that requires surgical correction.  Nose and throat surgeries often enlarge the airway to prevent collapse.  Most of these surgeries create pain for 1-2 weeks and up to half of the most common surgeries are not effective throughout life.  You should carefully discuss the benefits and drawbacks to surgery with your sleep provider and surgeon to determine if it is the best solution for you.   More information  Surgery for ROSHNI is directed at areas that are responsible for narrowing or complete obstruction of the airway during sleep.  There are a wide range of procedures available to enlarge and/or stabilize the airway to prevent blockage of breathing in the three major areas where it can occur: the palate, tongue, and nasal regions.  Successful surgical treatment depends on the accurate identification of the factors responsible for obstructive sleep apnea in each person.  A personalized approach is required because there is no single treatment that works well for everyone.  Because of anatomic variation, consultation with an examination by a sleep surgeon is a critical first step in determining what surgical options are best for each patient.  In some cases, examination during sedation may be recommended in order to guide the selection of procedures.  Patients will be counseled about risks and benefits as well as the typical recovery course after surgery. Surgery is typically  not a cure for a person s ROSHNI.  However, surgery will often significantly improve one s ROSHNI severity (termed  success rate ).  Even in the absence of a cure, surgery will decrease the cardiovascular risk associated with OSA7; improve overall quality of life8 (sleepiness, functionality, sleep quality, etc).  Palate Procedures:  Patients with ROSHNI often have narrowing of their airway in the region of their tonsils and uvula.  The goals of palate procedures are to widen the airway in this region as well as to help the tissues resist collapse.  Modern palate procedure techniques focus on tissue conservation and soft tissue rearrangement, rather than tissue removal.  Often the uvula is preserved in this procedure. Residual sleep apnea is common in patient after pharyngoplasty with an average reduction in sleep apnea events of 33%2.    Tongue Procedures:  ExamWhile patients are awake, the muscles that surround the throat are active and keep this region open for breathing. These muscles relax during sleep, allowing the tongue and other structures to collapse and block breathing.  There are several different tongue procedures available.  Selection of a tongue base procedure depends on characteristics seen on physical exam.  Generally, procedures are aimed at removing bulky tissues in this area or preventing the back of the tongue from falling back during sleep.  Success rates for tongue surgery range from 50-62%3.  Hypoglossal Nerve Stimulation:  Hypoglossal nerve stimulation has recently received approval from the [place] Food and Drug Administration for the treatment of obstructive sleep apnea.  This is based on research showing that the system was safe and effective in treating sleep apnea6.  Results showed that the median AHI score decreased 68%, from 29.3 to 9.0. This therapy uses an implant system that senses breathing patterns and delivers mild stimulation to airway muscles, which keeps the airway open during  sleep.  The system consists of three fully implanted components: a small generator (similar in size to a pacemaker), a breathing sensor, and a stimulation lead.  Using a small handheld remote, a patient turns the therapy on before bed and off upon awakening.    Candidates for this device must be greater than 22 years of age, have moderate to severe ROSHNI (AHI between 20-65), BMI less than 32, have tried CPAP/oral appliance without success, and have appropriate upper airway anatomy (determined by a sleep endoscopy performed by Dr. Coates).  Hypoglossal Nerve Stimulation Pathway:    The sleep surgeon s office will work with the patient through the insurance prior-authorization process (including communications and appeals).    Nasal Procedures:  Nasal obstruction can interfere with nasal breathing during the day and night.  Studies have shown that relief of nasal obstruction can improve the ability of some patients to tolerate positive airway pressure therapy for obstructive sleep apnea1.  Treatment options include medications such as nasal saline, topical corticosteroid and antihistamine sprays, and oral medications such as antihistamines or decongestants. Non-surgical treatments can include external nasal dilators for selected patients. If these are not successful by themselves, surgery can improve the nasal airway either alone or in combination with these other options.  Combination Procedures:  Combination of surgical procedures and other treatments may be recommended, particularly if patients have more than one area of narrowing or persistent positional disease.  The success rate of combination surgery ranges from 66-80%2,3.    References  1. Linda DAMON. The Role of the Nose in Snoring and Obstructive Sleep Apnoea: An Update.  Eur Arch Otorhinolaryngol. 2011; 268: 1365-73.  2.  Naveed SM; Yessy JA; Patricia JR; Pallanch JF; Dragan MARLOW; Daysi FERGUSON; Pat SANTANA. Surgical modifications of the upper airway for obstructive  sleep apnea in adults: a systematic review and meta-analysis. SLEEP 2010;33(10):3991-3239. Jessica ARAYA. Hypopharyngeal surgery in obstructive sleep apnea: an evidence-based medicine review.  Arch Otolaryngol Head Neck Surg. 2006 Feb;132(2):206-13.  3. Luis YH1, Kassidy Y, Fox ANGELI. The efficacy of anatomically based multilevel surgery for obstructive sleep apnea. Otolaryngol Head Neck Surg. 2003 Oct;129(4):327-35.  4. Kezirian E, Goldberg A. Hypopharyngeal Surgery in Obstructive Sleep Apnea: An Evidence-Based Medicine Review. Arch Otolaryngol Head Neck Surg. 2006 Feb;132(2):206-13.  5. Rudolph REYNOLDS et al. Upper-Airway Stimulation for Obstructive Sleep Apnea.  N Engl J Med. 2014 Jan 9;370(2):139-49.  6. Brandy Y et al. Increased Incidence of Cardiovascular Disease in Middle-aged Men with Obstructive Sleep Apnea. Am J Respir Crit Care Med; 2002 166: 159-165  7. Ji LAURENT et al. Studying Life Effects and Effectiveness of Palatopharyngoplasty (SLEEP) study: Subjective Outcomes of Isolated Uvulopalatopharyngoplasty. Otolaryngol Head Neck Surg. 2011; 144: 623-631.

## 2019-04-03 NOTE — PROGRESS NOTES
Sleep Consultation:    Date on this visit: 4/3/2019    Jordan Montano is sent by Juan Ramon Staley for a sleep consultation regarding snoring.    Primary Physician: Juan Ramon Staley     Jordan Montano reports excessive snoring for many years.  His medical history is significant for BPH.    Jordan goes to sleep at 9:30 PM during the week. He wakes up at 5:45 AM with an alarm. He falls asleep in 5 minutes.  Jordan denies difficulty falling asleep.  He wakes up 4-5 times a night for 1-2 minutes before falling back to sleep.  Jordan wakes up to go to the bathroom, change position, feeling hot, snoring.  On weekends, Jordan goes to sleep at 9:30 PM.  He wakes up at 7:30 AM without an alarm. He falls asleep in 5 minutes.  Patient gets an average of 7-8 hours of sleep per night.     Patient does use electronics in bed and watch TV in bed and does not worry in bed about anything and read in bed.     Jordan does not do shift work.  He works day shifts.  He is the  of a law firm. He lives with his wife, son and 2 daughters.    Jordan does snore every night and snoring is loud. Patient does have a regular bed partner. They never sleep separately due to his snoring, but his wife wears ear plugs and tries to get to sleep before him. No one wants to share a room with him when he goes hunting. There is report of gasping and snorting.  He does have witnessed apneas.   Patient sleeps on his back, side and stomach. He has occasional snort arousals (1-2 times per night), denies morning dry mouth, morning headaches, morning confusion and restless legs. Jordan denies any bruxism, sleep walking, sleep talking, dream enactment, sleep paralysis, cataplexy and hypnogogic/hypnopompic hallucinations.    Jordan denies difficulty breathing through his nose, claustrophobia, reflux at night, heartburn and depression.      Jordan has gained 15-20 pounds in the last 5 year.  Patient describes themself as a morning person.  He would prefer to  go to sleep at 10:00-10:30 PM and wake up at 7:30-8:00 AM.  Patient's [place] Sleepiness score 13/24 consistent with mild daytime sleepiness.      Jordan rarely takes naps. He takes some inadvertant naps watching TV.  He denies dozing while driving. Patient was counseled on the importance of driving while alert, to pull over if drowsy, or nap before getting into the vehicle if sleepy.  He uses 6 cups/day of coffee. Last caffeine intake is usually before noon.    Allergies:    No Known Allergies    Medications:    Current Outpatient Medications   Medication Sig Dispense Refill     tamsulosin (FLOMAX) 0.4 MG capsule Take 1 capsule (0.4 mg) by mouth daily 90 capsule 3       Problem List:  Patient Active Problem List    Diagnosis Date Noted     BPH (benign prostatic hyperplasia) 03/23/2018     Priority: Medium     CT scan 03/18       CARDIOVASCULAR SCREENING; LDL GOAL LESS THAN 160 02/10/2010     Priority: Medium     Lumbar Degenerative Disc Dz 06/30/2006     Priority: Medium     Cardiac Risk Factors 01/21/2006     Priority: Medium     none; goal LDL < 160; [place] 10-year Cardiac Risk Score of <1%           Past Medical/Surgical History:  Past Medical History:   Diagnosis Date     NO ACTIVE PROBLEMS      Past Surgical History:   Procedure Laterality Date     L4/5 LAMINECT/DISCECTOMY, LUMBAR  2007     PROSTATE SURGERY  08/14/2018    TRUS WITH BX     TESTICLE SURGERY       VASECTOMY  2005     VASECTOMY         Social History:  Social History     Socioeconomic History     Marital status:      Spouse name: Not on file     Number of children: 3     Years of education: 17     Highest education level: Not on file   Occupational History     Employer: [employer]   Social Needs     Financial resource strain: Not on file     Food insecurity:     Worry: Not on file     Inability: Not on file     Transportation needs:     Medical: Not on file     Non-medical: Not on file   Tobacco Use     Smoking status: Never Smoker      Smokeless tobacco: Never Used   Substance and Sexual Activity     Alcohol use: Yes     Comment: occasional social use, maybe 1x/wk     Drug use: No     Sexual activity: Yes     Partners: Female     Birth control/protection: Pill, Surgical     Comment: monogamous   Lifestyle     Physical activity:     Days per week: Not on file     Minutes per session: Not on file     Stress: Not on file   Relationships     Social connections:     Talks on phone: Not on file     Gets together: Not on file     Attends [employer]ology service: Not on file     Active member of club or organization: Not on file     Attends meetings of clubs or organizations: Not on file     Relationship status: Not on file     Intimate partner violence:     Fear of current or ex partner: Not on file     Emotionally abused: Not on file     Physically abused: Not on file     Forced sexual activity: Not on file   Other Topics Concern     Parent/sibling w/ CABG, MI or angioplasty before 65F 55M? No   Social History Narrative     Not on file       Family History:  Family History   Problem Relation Age of Onset     Family History Negative Mother      Cardiovascular Maternal Uncle         Heart bypass at age 33     Prostate Cancer Maternal Uncle      Gastrointestinal Disease Child         celiac sprue     Diabetes No family hx of      Cancer - colorectal No family hx of        Review of Systems:  A complete review of systems reviewed by me is negative with the exeption of what has been mentioned in the history of present illness.  CONSTITUTIONAL: NEGATIVE for weight gain/loss, fever, chills, sweats or night sweats, drug allergies.  EYES: NEGATIVE for changes in vision, blind spots, double vision.  ENT: NEGATIVE for ear pain, sore throat, sinus pain, post-nasal drip, runny nose, bloody nose  CARDIAC: NEGATIVE for fast heartbeats or fluttering in chest, chest pain or pressure, breathlessness when lying flat, swollen legs or swollen feet.  NEUROLOGIC: NEGATIVE  "headaches, weakness or numbness in the arms or legs.  DERMATOLOGIC: NEGATIVE for rashes, new moles or change in mole(s)  PULMONARY: NEGATIVE SOB at rest, SOB with activity, dry cough, productive cough, coughing up blood, wheezing or whistling when breathing.    GASTROINTESTINAL: NEGATIVE for nausea or vomitting, loose or watery stools, fat or grease in stools, constipation, abdominal pain, bowel movements black in color or blood noted.  GENITOURINARY: NEGATIVE for pain during urination, blood in urine, irregular menstrual periods.  GENITOURINARY:  POSITIVE for  urinating more frequently than usual  MUSCULOSKELETAL: NEGATIVE for muscle pain, bone or joint pain, swollen joints.  ENDOCRINE: NEGATIVE for increased thirst or urination, diabetes.  LYMPHATIC: NEGATIVE for swollen lymph nodes, lumps or bumps in the breasts or nipple discharge.    Physical Examination:  Vitals: /79   Pulse 66   Resp 16   Ht 1.803 m (5' 11\")   Wt 89.8 kg (198 lb)   SpO2 97%   BMI 27.62 kg/m      Neck Cir (cm): 39 cm    [place] Total Score 4/3/2019   Total score - [place] 13       DOUG Total Score: 9 (04/03/19 1418)    GENERAL APPEARANCE: healthy, alert, no distress and cooperative  EYES: Eyes grossly normal to inspection, PERRL, conjunctivae and sclerae normal and lids and lashes normal  HENT: nose and mouth without ulcers or lesions, oral mucous membranes moist and oropharynx clear, mildly deviated septum, large tongue-more anteriorly than posteriorly  NECK: no adenopathy, no asymmetry, masses, or scars, thyroid normal to palpation and trachea midline and normal to palpation  RESP: lungs clear to auscultation - no rales, rhonchi or wheezes  CV: regular rates and rhythm, normal S1 S2, no S3 or S4, no murmur, click or rub and no irregular beats  LYMPHATICS: no cervical adenopathy  MS: extremities normal- no gross deformities noted  NEURO: Normal strength and tone, mentation intact, speech normal and cranial nerves 2-12 " intact  Mallampati Class: I.  Tonsillar Stage: 1  hidden by pillars.    Impression/Plan:    (R06.83) Snoring  (primary encounter diagnosis), (G47.30) Observed sleep apnea  Comment: Jordan presents with concerns of excessive snoring. It has become socially disruptive to his wife and people with whom he travels. He may notice waking with a snort 1-2 times per night. His wife also observes pauses with snort awakenings. His risk for apnea on the STOP BANG scale is elevated. STOP BANG TOTAL: 5 snoring, tired (ESS 13/24), observed apnea, age 49, male. However, I get the sense his risk may be overestimated. His sleepiness may be overestimated as he primarily only dozes when watching TV. He stays active, so does not have much chance to doze. His airway appears quiet patent, although his tongue is large. Negative risk factors include; no HTN, BMI <35 (27), neck <40 cm (39 cm). He seems to be more interested in CPAP than a dental appliance.  Plan: HST-Home Sleep Apnea Test            Literature provided regarding sleep apnea.      He will follow up with me in approximately two weeks after his sleep study has been competed to review the results and discuss plan of care.       Polysomnography reviewed.  Obstructive sleep apnea reviewed.  Complications of untreated sleep apnea were reviewed.  45 minutes was spent during this visit, over 50% in counseling and coordination of care.   Bennett Goltz, PA-C    CC: Juan Ramon Staley PA-C

## 2019-04-18 ENCOUNTER — OFFICE VISIT (OUTPATIENT)
Dept: SLEEP MEDICINE | Facility: CLINIC | Age: 49
End: 2019-04-18
Attending: PHYSICIAN ASSISTANT
Payer: COMMERCIAL

## 2019-04-18 DIAGNOSIS — G47.30 OBSERVED SLEEP APNEA: ICD-10-CM

## 2019-04-18 DIAGNOSIS — R06.83 SNORING: ICD-10-CM

## 2019-04-18 PROCEDURE — G0399 HOME SLEEP TEST/TYPE 3 PORTA: HCPCS | Performed by: INTERNAL MEDICINE

## 2019-04-19 ENCOUNTER — DOCUMENTATION ONLY (OUTPATIENT)
Dept: SLEEP MEDICINE | Facility: CLINIC | Age: 49
End: 2019-04-19
Payer: COMMERCIAL

## 2019-04-19 NOTE — PROCEDURES
"HOME SLEEP STUDY INTERPRETATION    Patient: Jordan Montano  MRN: [id number]  YOB: 1970  Study Date: 2019  Referring Provider: Juan Ramon Staley;   Ordering Provider: Bennett Goltz, PA     Indications for Home Study: Jordan Montano is a 49 year old male with a history of BPH who presents with symptoms suggestive of obstructive sleep apnea.    Estimated body mass index is 27.62 kg/m  as calculated from the following:    Height as of 4/3/19: 1.803 m (5' 11\").    Weight as of 4/3/19: 89.8 kg (198 lb).  Total score - [place]: 13 (4/3/2019  2:18 PM)  STOP-BAN/8    Data: A full night home sleep study was performed recording the standard physiologic parameters including body position, movement, sound, nasal pressure, thermal oral airflow, chest and abdominal movements with respiratory inductance plethysmography, and oxygen saturation by pulse oximetry. Pulse rate was estimated by oximetry recording. This study was considered adequate based on > 4 hours of quality oximetry and respiratory recording. As specified by the AASM Manual for the Scoring of Sleep and Associated events, version 2.3, Rule VIII.D 1B, 4% oxygen desaturation scoring for hypopneas is used as a standard of care on all home sleep apnea testing.    Analysis Time:  438.8 minutes    Respiration:   Sleep Associated Hypoxemia: sustained hypoxemia was not present. Baseline oxygen saturation was 94%.  Time with saturation less than or equal to 88% was 0 minutes. The lowest oxygen saturation was 90%.   Snoring: Snoring was present.  Respiratory events: The home study revealed a presence of 18 obstructive apneas and 6 mixed and 51 central apneas. There were 6 hypopneas resulting in a combined apnea/hypopnea index [AHI] of 11.1 events per hour.  AHI was 27 per hour supine, - per hour prone, 0.8 per hour on left side, and 1.1 per hour on right side.   Pattern: Excluding events noted above, respiratory rate and pattern was " Normal.    Position: Percent of time spent: supine - 38.7%, prone - 0%, on left - 35.4%, on right - 25.9%.    Heart Rate: By pulse oximetry normal rate was noted.     Assessment:   Mild obstructive and central sleep apnea.  Majority of respiratory events were reported as central apneas. Snoring and obstructive events were also noted.   Sleep associated hypoxemia was not present.    Recommendations:  Options include in lab polysomnogram for accurate assessment of patient's sleep disordered breathing or empirically starting treatment targeted at obstructive component. Auto PAP therapy or dental appliance are considerations.    Suggest optimizing sleep hygiene and avoiding sleep deprivation.  Weight management.    Diagnosis Code(s): Obstructive Sleep Apnea G47.33, Central Sleep Apnea G47.31    Aman Holbrook MD, MD, April 19, 2019   Diplomate, American Board of Psychiatry and Neurology, Sleep Medicine

## 2019-04-19 NOTE — PROGRESS NOTES
This HSAT was performed using a Noxturnal T3 device which recorded snore, sound, movement activity, body position, nasal pressure, oronasal thermal airflow, pulse, oximetry and both chest and abdominal respiratory effort. HSAT data was restricted to the time patient states they were in bed.     HSAT was scored using 1B 4% hypopnea rule.     HST AHI (Non-PAT): 13.8  Snoring was reported as mild.  Time with SpO2 below 89% was 0 minutes.   Overall signal quality was good     Pt will follow up with sleep provider to determine appropriate therapy.

## 2019-04-19 NOTE — PROGRESS NOTES
This HSAT was performed using a Noxturnal T3 device which recorded snore, sound, movement activity, body position, nasal pressure, oronasal thermal airflow, pulse, oximetry and both chest and abdominal respiratory effort. HSAT data was restricted to the time patient states they were in bed.     HSAT was scored using 1B 4% hypopnea rule.     HST AHI (Non-PAT): 13.8  Snoring was reported as mild and moderate.  Time with SpO2 below 89% was 0 minutes.   Overall signal quality was good.     Pt will follow up with sleep provider to determine appropriate therapy.

## 2019-04-22 DIAGNOSIS — R39.12 BENIGN PROSTATIC HYPERPLASIA WITH WEAK URINARY STREAM: ICD-10-CM

## 2019-04-22 DIAGNOSIS — N40.1 BENIGN PROSTATIC HYPERPLASIA WITH WEAK URINARY STREAM: ICD-10-CM

## 2019-04-22 NOTE — TELEPHONE ENCOUNTER
"Requested Prescriptions   Pending Prescriptions Disp Refills     tamsulosin (FLOMAX) 0.4 MG capsule [Pharmacy Med Name: TAMSULOSIN 0.4MG CAPSULES] 90 capsule 0     Sig: TAKE 1 CAPSULE(0.4 MG) BY MOUTH DAILY  Last Written Prescription Date:  04/23/2018  Last Fill Quantity: 90 capsule,  # refills: 3    Last office visit: 2/25/2019 with prescribing provider:  Juan Ramon Staley PA-C        Future Office Visit:           Alpha Blockers Passed - 4/22/2019 12:44 PM        Passed - Blood pressure under 140/90 in past 12 months     BP Readings from Last 3 Encounters:   04/03/19 125/79   02/25/19 122/82   08/14/18 132/82                 Passed - Recent (12 mo) or future (30 days) visit within the authorizing provider's specialty     Patient had office visit in the last 12 months or has a visit in the next 30 days with authorizing provider or within the authorizing provider's specialty.  See \"Patient Info\" tab in inbasket, or \"Choose Columns\" in Meds & Orders section of the refill encounter.              Passed - Patient does not have Tadalafil, Vardenafil, or Sildenafil on their medication list        Passed - Medication is active on med list        Passed - Patient is 18 years of age or older          "

## 2019-04-24 RX ORDER — TAMSULOSIN HYDROCHLORIDE 0.4 MG/1
CAPSULE ORAL
Qty: 90 CAPSULE | Refills: 2 | Status: SHIPPED | OUTPATIENT
Start: 2019-04-24 | End: 2020-01-20

## 2019-04-24 NOTE — TELEPHONE ENCOUNTER
Prescription approved per [facility], [facility] or [facility] refill protocol.  Marely AVELAR RN - Triage  [facility]

## 2019-05-08 ENCOUNTER — OFFICE VISIT (OUTPATIENT)
Dept: SLEEP MEDICINE | Facility: CLINIC | Age: 49
End: 2019-05-08
Payer: COMMERCIAL

## 2019-05-08 VITALS
OXYGEN SATURATION: 98 % | HEART RATE: 66 BPM | HEIGHT: 71 IN | WEIGHT: 198 LBS | RESPIRATION RATE: 16 BRPM | BODY MASS INDEX: 27.72 KG/M2 | DIASTOLIC BLOOD PRESSURE: 87 MMHG | SYSTOLIC BLOOD PRESSURE: 137 MMHG

## 2019-05-08 DIAGNOSIS — G47.33 OSA (OBSTRUCTIVE SLEEP APNEA): Primary | ICD-10-CM

## 2019-05-08 PROCEDURE — 99214 OFFICE O/P EST MOD 30 MIN: CPT | Performed by: PHYSICIAN ASSISTANT

## 2019-05-08 ASSESSMENT — MIFFLIN-ST. JEOR: SCORE: 1780.25

## 2019-05-08 NOTE — PROGRESS NOTES
Sleep Study Follow-Up Visit:    Date on this visit: 5/8/2019    Jordan Montano comes in today for follow-up of his home sleep study done on 4/18/2019 at the [facility] for excessive snoring for many years.  His medical history is significant for BPH.    Analysis Time:  438.8 minutes     Respiration:   Sleep Associated Hypoxemia: sustained hypoxemia was not present. Baseline oxygen saturation was 94%.  Time with saturation less than or equal to 88% was 0 minutes. The lowest oxygen saturation was 90%.   Snoring: Snoring was present.  Respiratory events: The home study revealed a presence of 18 obstructive apneas and 6 mixed and 51 central apneas. There were 6 hypopneas resulting in a combined apnea/hypopnea index [AHI] of 11.1 events per hour.  AHI was 27 per hour supine, - per hour prone, 0.8 per hour on left side, and 1.1 per hour on right side.   Pattern: Excluding events noted above, respiratory rate and pattern was Normal.     Position: Percent of time spent: supine - 38.7%, prone - 0%, on left - 35.4%, on right - 25.9%.     Heart Rate: By pulse oximetry normal rate was noted.     Past medical/surgical history, family history, social history, medications and allergies were reviewed.      Problem List:  Patient Active Problem List    Diagnosis Date Noted     BPH (benign prostatic hyperplasia) 03/23/2018     Priority: Medium     CT scan 03/18       CARDIOVASCULAR SCREENING; LDL GOAL LESS THAN 160 02/10/2010     Priority: Medium     Lumbar Degenerative Disc Dz 06/30/2006     Priority: Medium     Cardiac Risk Factors 01/21/2006     Priority: Medium     none; goal LDL < 160; [place] 10-year Cardiac Risk Score of <1%           Impression/Plan:    (G47.33) ROSHNI (obstructive sleep apnea)  (primary encounter diagnosis)  Comment: mild apnea, almost exclusively positional.  This home study showed the majority of his events were central apneas.  She does not have a reason for having central apneas.  He is  most concerned about treating his snoring for his wife sick, but he does have some sleepiness, ESS 13/24.  Plan: Comprehensive DME        We reviewed treatment options including CPAP, dental appliance, weight loss, positional restriction. The patient was most interested in CPAP. I am prescribing auto CPAP 5-15 cm, heated humidifier and compliance meter. The patient was informed of the mask exchange policy and the compliance goals. They were also informed that they would be followed by the sleep therapy management team during the first month of CPAP use.  We will monitor his download closely for residual events, specifically central apnea.      He will follow up with me in about 2 month(s).     Twenty-five minutes spent with patient, all of which were spent face-to-face counseling, consulting, coordinating plan of care.      Bennett Goltz, PA-C    CC: Juan Ramon Staley PA-C

## 2019-05-08 NOTE — NURSING NOTE
"Chief Complaint   Patient presents with     Study Results     HST f/u       Initial /87   Pulse 66   Resp 16   Ht 1.803 m (5' 11\")   Wt 89.8 kg (198 lb)   SpO2 98%   BMI 27.62 kg/m   Estimated body mass index is 27.62 kg/m  as calculated from the following:    Height as of this encounter: 1.803 m (5' 11\").    Weight as of this encounter: 89.8 kg (198 lb).    Medication Reconciliation: complete     ESS 12  Sera Feldman CMA      "

## 2019-05-08 NOTE — PATIENT INSTRUCTIONS
You will be provided with an auto-titrating CPAP with a pressure range of 5-15 cm with heated humidity to limit nasal congestion. Adjust the heat level on humidifier to find a setting that prevents dry nose but does not cause condensation in the hose or mask. Use distilled water in the humidifier.  The CPAP has a ramp function that starts the pressure lower than your prescribed pressure and gradually increases it over a number of minutes.  This may make it easier to fall asleep.    Try to use the CPAP every-night, all night (minimum of 4 hours). Many insurances require that we prove you are using the CPAP at least 4 hours on at least 70% of nights over a 30 day period. We have 90 days to meet those criteria.            Discussed weight management and the impact of weight gain on sleep apnea.  Let me know if you snore or feel the pressure is too high.    You can get new supplies (mask, hose and filter) for your CPAP every 3-6 months, covered by insurance. You do not need to get supplies that often, but they are available if you would like them.  You may exchange the mask once within the first month if you feel the initial mask does not fit well.  Contact your medical equipment provider for equipment issues.  Please let me know if you have any return of snoring, daytime sleepiness or poor sleep quality. We will want to make sure your CPAP is adequately treating your apnea.  There is a website called CPAP.com that has accessories that may make CPAP use easier. Please visit it at your convenience.  Our phone number is [phone].    Follow-up 2 month.  Bring your CPAP machine with you to the follow up appointment.    Your BMI is Body mass index is 27.62 kg/m .  Weight management is a personal decision.  If you are interested in exploring weight loss strategies, the following discussion covers the approaches that may be successful. Body mass index (BMI) is one way to tell whether you are at a healthy weight,  overweight, or obese. It measures your weight in relation to your height.  A BMI of 18.5 to 24.9 is in the healthy range. A person with a BMI of 25 to 29.9 is considered overweight, and someone with a BMI of 30 or greater is considered obese. More than two-thirds of American adults are considered overweight or obese.  Being overweight or obese increases the risk for further weight gain. Excess weight may lead to heart disease and diabetes.  Creating and following plans for healthy eating and physical activity may help you improve your health.  Weight control is part of healthy lifestyle and includes exercise, emotional health, and healthy eating habits. Careful eating habits lifelong are the mainstay of weight control. Though there are significant health benefits from weight loss, long-term weight loss with diet alone may be very difficult to achieve- studies show long-term success with dietary management in less than 10% of people. Attaining a healthy weight may be especially difficult to achieve in those with severe obesity. In some cases, medications, devices and surgical management might be considered.  What can you do?  If you are overweight or obese and are interested in methods for weight loss, you should discuss this with your provider.     Consider reducing daily calorie intake by 500 calories.     Keep a food journal.     Avoiding skipping meals, consider cutting portions instead.    Diet combined with exercise helps maintain muscle while optimizing fat loss. Strength training is particularly important for building and maintaining muscle mass. Exercise helps reduce stress, increase energy, and improves fitness. Increasing exercise without diet control, however, may not burn enough calories to loose weight.       Start walking three days a week 10-20 minutes at a time    Work towards walking thirty minutes five days a week     Eventually, increase the speed of your walking for 1-2 minutes at time    In  addition, we recommend that you review healthy lifestyles and methods for weight loss available through the National Institutes of Health patient information sites:  http://win.niddk.nih.gov/publications/index.htm    And look into health and wellness programs that may be available through your health insurance provider, employer, local community center, or meg club.    Weight management plan: Patient was referred to their PCP to discuss a diet and exercise plan.

## 2019-05-29 ENCOUNTER — DOCUMENTATION ONLY (OUTPATIENT)
Dept: SLEEP MEDICINE | Facility: CLINIC | Age: 49
End: 2019-05-29
Payer: COMMERCIAL

## 2019-05-29 NOTE — PROGRESS NOTES
Patient was offered choice of vendor and chose Pending sale to [facility].  Patient Jordan Montano was set up at [place] on May 29, 2019. Patient received a [employer] DreamStation Auto. Pressures were set at 5-15 cm H2O.   Patient s ramp is 5 cm H2O for 30 min and FLEX/EPR is A Flex, 2.  Patient received a Resmed Mask name: [id number]  Nasal mask size Medium, heated tubing and heated humidifier.  Patient is enrolled in the STM Program and does not need to meet compliance. Patient has a follow up on patient to schedule with Bennett Goltz, PA.    SHARON CONLEY

## 2019-06-03 ENCOUNTER — DOCUMENTATION ONLY (OUTPATIENT)
Dept: SLEEP MEDICINE | Facility: CLINIC | Age: 49
End: 2019-06-03

## 2019-06-03 NOTE — PROGRESS NOTES
3 DAY STM VISIT    Diagnostic AHI:   11.1 HST    Patient contacted for 3 day STM visit  Subjective measures:  Pt reporting that things are feeling fine.  He has adjust ramp time on his own.  He is struggling a bit with mask seal on his side, however it is not bothering him and he is working on easy ways to correct this.  Data not transmitting from the device.  He will force a call from his device tonight.      Device type: Auto-CPAP  PAP settings from order::  CPAP min 5 cm  H20       CPAP max 15 cm  H20         Mask type:    Nasal Mask     Device settings from machine        Assessment: No usage reporting but has a profile in [employer].   Action plan: Pt to have f/u 14 day STM visit.  Patient has a follow up visit scheduled:   no.    Total time spent on remote patient monitoring data analysis and patient contact today:   14 minutes

## 2019-06-13 ENCOUNTER — DOCUMENTATION ONLY (OUTPATIENT)
Dept: SLEEP MEDICINE | Facility: CLINIC | Age: 49
End: 2019-06-13
Payer: COMMERCIAL

## 2019-06-13 DIAGNOSIS — G47.33 OSA (OBSTRUCTIVE SLEEP APNEA): Primary | ICD-10-CM

## 2019-06-13 NOTE — PROGRESS NOTES
14 DAY STM VISIT    Diagnostic AHI:  11.1 HST      Subjective measures:   Patient stated things are going well and he is feeling fine with CPAP.  Patient okay with pressure change.     Assessment: Pt not meeting objective benchmarks for AHI Patient meeting subjective benchmarks. Patient okay with pressure change.     Action plan: Order placed to provider for pressure change and pt to have 30 day STM visit.    Device type: Auto-CPAP    PAP settings: CPAP min 5 cm  H20       CPAP max 15 cm  H20       90th % pressure 9.4 cm  H20    Mask type:  Nasal Mask    Objective measures: 14 day rolling measures         Compliance  78 %     % of night spent in large leak  0 % last  upload      AHI 12.68   last  upload      Average number of minutes 351          Objective measure goal  Compliance   Goal >70%  Leak   Goal < 10%  AHI  Goal < 5  Usage  Goal >240      Total time spent on remote patient monitoring data analysis and patient contact today:   17 minutes

## 2019-06-13 NOTE — Clinical Note
Chucky Mary this is a add on from my last note sorry about this request pressure change 6-10 cm H20.  Thanks

## 2019-07-01 ENCOUNTER — DOCUMENTATION ONLY (OUTPATIENT)
Dept: SLEEP MEDICINE | Facility: CLINIC | Age: 49
End: 2019-07-01

## 2019-07-01 NOTE — PROGRESS NOTES
30 DAY STM VISIT    Diagnostic AHI:   11.1 HST      Subjective measures: Pt states things are going well and has no issues or complaints.  Pt is benefiting from therapy.         Assessment: Pt not meeting objective benchmarks for AHI (central and obstructive events) this is decreasing.  Patient meeting subjective benchmarks.   Action plan: pt to have 6 month STM visit  Patient has not scheduled a follow up visit with Bennett Goltz, PA.    Device type: Auto-CPAP  PAP settings: CPAP min 6 cm  H20     CPAP max 10 cm  H20         90th % pressure8.8 cm  H20    Mask type:  Nasal Mask    Objective measures: 14 day rolling measures         Compliance  85 %     % of night spent in large leak  0 % last  upload      AHI 7.95   last  Upload- few central events.        Average number of minutes 437          Objective measure goal  Compliance   Goal >70%  Leak   Goal < 10%  AHI  Goal < 5  Usage  Goal >240      Total time spent on remote patient monitoring data analysis and patient contact today:   13 minutes

## 2019-11-26 ENCOUNTER — DOCUMENTATION ONLY (OUTPATIENT)
Dept: SLEEP MEDICINE | Facility: CLINIC | Age: 49
End: 2019-11-26

## 2019-11-26 NOTE — PROGRESS NOTES
6 Month STM visit    Diagnostic AHI:   11.1  HST    Message left for patient to return call     Assessment: Pt not meeting objective benchmarks for AHI    Action plan: waiting for patient to return call.   pt to follow up per provider request    Device type: Auto-CPAP  PAP settings: CPAP min 6 cm  H20     CPAP max 10 cm  H20     90th % pressure9 cm  H20    Objective measures: 14 day rolling measures         Compliance  78 %     % of night spent in large leak  1 % last  upload      AHI 8.93   last  Upload-central and obstructive events       Average number of minutes 382           Objective measure goal  Compliance   Goal >70%  Leak   Goal < 10%  AHI  Goal < 5  Usage  Goal >240

## 2019-11-27 NOTE — PROGRESS NOTES
Patient returned call.    Subjective measures:  Pt states things are going well and has no issues or complaints.  Pt is benefiting from therapy.     Assessment: Pt not meeting objective benchmarks for AHI Patient meeting subjective benchmarks.     Action plan:follow up per provider request    Total time spent on remote patient monitoring data analysis and patient contact today:   5 minutes

## 2020-01-17 DIAGNOSIS — N40.1 BENIGN PROSTATIC HYPERPLASIA WITH WEAK URINARY STREAM: ICD-10-CM

## 2020-01-17 DIAGNOSIS — R39.12 BENIGN PROSTATIC HYPERPLASIA WITH WEAK URINARY STREAM: ICD-10-CM

## 2020-01-17 NOTE — TELEPHONE ENCOUNTER
"Requested Prescriptions   Pending Prescriptions Disp Refills     tamsulosin (FLOMAX) 0.4 MG capsule [Pharmacy Med Name: TAMSULOSIN 0.4MG CAPSULES] 90 capsule 2     Sig: TAKE 1 CAPSULE(0.4 MG) BY MOUTH DAILY   Last Written Prescription Date:  4/24/19  Last Fill Quantity: 90,  # refills: 2   Last office visit: 2/25/2019 with prescribing provider:  Juan Ramon Staley PA-C   Future Office Visit:        Alpha Blockers Passed - 1/17/2020  3:18 AM        Passed - Blood pressure under 140/90 in past 12 months     BP Readings from Last 3 Encounters:   05/08/19 137/87   04/03/19 125/79   02/25/19 122/82                 Passed - Recent (12 mo) or future (30 days) visit within the authorizing provider's specialty     Patient has had an office visit with the authorizing provider or a provider within the authorizing providers department within the previous 12 mos or has a future within next 30 days. See \"Patient Info\" tab in inbasket, or \"Choose Columns\" in Meds & Orders section of the refill encounter.              Passed - Patient does not have Tadalafil, Vardenafil, or Sildenafil on their medication list        Passed - Medication is active on med list        Passed - Patient is 18 years of age or older         "

## 2020-01-20 RX ORDER — TAMSULOSIN HYDROCHLORIDE 0.4 MG/1
CAPSULE ORAL
Qty: 30 CAPSULE | Refills: 0 | Status: SHIPPED | OUTPATIENT
Start: 2020-01-20 | End: 2020-02-25

## 2020-01-20 NOTE — TELEPHONE ENCOUNTER
Medication is being filled for 1 time refill only due to:  Patient needs to be seen because due for an appt February 2020.     Will forward to the station, please try and help the pt schedule an appt for a physical.  Thanks!

## 2020-02-25 ENCOUNTER — MYC REFILL (OUTPATIENT)
Dept: FAMILY MEDICINE | Facility: CLINIC | Age: 50
End: 2020-02-25

## 2020-02-25 DIAGNOSIS — R39.12 BENIGN PROSTATIC HYPERPLASIA WITH WEAK URINARY STREAM: ICD-10-CM

## 2020-02-25 DIAGNOSIS — N40.1 BENIGN PROSTATIC HYPERPLASIA WITH WEAK URINARY STREAM: ICD-10-CM

## 2020-02-26 RX ORDER — TAMSULOSIN HYDROCHLORIDE 0.4 MG/1
0.4 CAPSULE ORAL DAILY
Qty: 90 CAPSULE | Refills: 0 | Status: SHIPPED | OUTPATIENT
Start: 2020-02-26 | End: 2020-06-03

## 2020-02-26 NOTE — TELEPHONE ENCOUNTER
"Routing refill request to provider for review/approval because:  Abida given x 1. Appt scheduled and cancelled by pt. Will route to TC's also to assist in scheduling.   Gypsy ROSA RN       Tamsulosin 0.4 mg   Last Written Prescription Date:  10/20/2020  Last Fill Quantity: 30,  # refills: 0   Last office visit: 2/25/2019 with prescribing provider:  BIPIN   Future Office Visit:    Requested Prescriptions   Pending Prescriptions Disp Refills     tamsulosin (FLOMAX) 0.4 MG capsule 30 capsule 0       Alpha Blockers Failed - 2/25/2020  6:26 AM        Failed - Recent (12 mo) or future (30 days) visit within the authorizing provider's specialty     Patient has had an office visit with the authorizing provider or a provider within the authorizing providers department within the previous 12 mos or has a future within next 30 days. See \"Patient Info\" tab in inbasket, or \"Choose Columns\" in Meds & Orders section of the refill encounter.              Passed - Blood pressure under 140/90 in past 12 months     BP Readings from Last 3 Encounters:   05/08/19 137/87   04/03/19 125/79   02/25/19 122/82                 Passed - Patient does not have Tadalafil, Vardenafil, or Sildenafil on their medication list        Passed - Medication is active on med list        Passed - Patient is 18 years of age or older          "

## 2020-02-27 ENCOUNTER — TRANSFERRED RECORDS (OUTPATIENT)
Dept: HEALTH INFORMATION MANAGEMENT | Facility: CLINIC | Age: 50
End: 2020-02-27

## 2020-03-22 ENCOUNTER — HEALTH MAINTENANCE LETTER (OUTPATIENT)
Age: 50
End: 2020-03-22

## 2020-03-22 ENCOUNTER — MYC MEDICAL ADVICE (OUTPATIENT)
Dept: FAMILY MEDICINE | Facility: CLINIC | Age: 50
End: 2020-03-22

## 2020-05-24 DIAGNOSIS — R39.12 BENIGN PROSTATIC HYPERPLASIA WITH WEAK URINARY STREAM: ICD-10-CM

## 2020-05-24 DIAGNOSIS — N40.1 BENIGN PROSTATIC HYPERPLASIA WITH WEAK URINARY STREAM: ICD-10-CM

## 2020-05-27 RX ORDER — TAMSULOSIN HYDROCHLORIDE 0.4 MG/1
CAPSULE ORAL
Qty: 90 CAPSULE | Refills: 0
Start: 2020-05-27

## 2020-05-27 NOTE — TELEPHONE ENCOUNTER
Call to pt to schedule video visit.     He has enough medication to get him to appt.     He would also like you to look over his [place] physical incase there is anything you would like to talk about.     Gypsy ROSA RN

## 2020-06-03 ENCOUNTER — VIRTUAL VISIT (OUTPATIENT)
Dept: FAMILY MEDICINE | Facility: CLINIC | Age: 50
End: 2020-06-03
Payer: COMMERCIAL

## 2020-06-03 DIAGNOSIS — R39.12 BENIGN PROSTATIC HYPERPLASIA WITH WEAK URINARY STREAM: Primary | ICD-10-CM

## 2020-06-03 DIAGNOSIS — N40.1 BENIGN PROSTATIC HYPERPLASIA WITH WEAK URINARY STREAM: Primary | ICD-10-CM

## 2020-06-03 DIAGNOSIS — R03.0 ELEVATED BLOOD PRESSURE READING WITHOUT DIAGNOSIS OF HYPERTENSION: ICD-10-CM

## 2020-06-03 PROCEDURE — 99214 OFFICE O/P EST MOD 30 MIN: CPT | Mod: GT | Performed by: PHYSICIAN ASSISTANT

## 2020-06-03 RX ORDER — TAMSULOSIN HYDROCHLORIDE 0.4 MG/1
0.4 CAPSULE ORAL DAILY
Qty: 90 CAPSULE | Refills: 1 | Status: SHIPPED | OUTPATIENT
Start: 2020-06-03 | End: 2020-11-29

## 2020-06-03 NOTE — PROGRESS NOTES
"Jordan Montano is a 50 year old male who is being evaluated via a billable video visit.      The patient has been notified of following:     \"This video visit will be conducted via a call between you and your physician/provider. We have found that certain health care needs can be provided without the need for an in-person physical exam.  This service lets us provide the care you need with a video conversation.  If a prescription is necessary we can send it directly to your pharmacy.  If lab work is needed we can place an order for that and you can then stop by our lab to have the test done at a later time.    Video visits are billed at different rates depending on your insurance coverage.  Please reach out to your insurance provider with any questions.    If during the course of the call the physician/provider feels a video visit is not appropriate, you will not be charged for this service.\"    Patient has given verbal consent for Video visit? Yes    How would you like to obtain your AVS? Kathrinhardominic    Patient would like the video invitation sent by: Send to e-mail at: [email or web address]    Will anyone else be joining your video visit? No      Subjective     Jordan Montano is a 50 year old male who presents today via video visit for the following health issues:    HPI  Medication Followup of Flomax 0.4 mg daily    Taking Medication as prescribed: yes    Side Effects:  None    Medication Helping Symptoms:  yes     Patient wants to know if PCP has any questions from 3/17/2020 at [facility]  Labs, notes, and Exercise ECG available through Care Everywhere for review  He had an elevated glucose and blood pressure  Has eaten a better diet sine then, exercise haspicked up    Urination continues to be well controlled   He'll note symptoms if he misses a few days in a row  PSA was improved at last check with [facility]    Video Start Time: 1130        Patient Active Problem List   Diagnosis     Cardiac Risk Factors     Lumbar " "Degenerative Disc Dz     CARDIOVASCULAR SCREENING; LDL GOAL LESS THAN 160     BPH (benign prostatic hyperplasia)     ROSHNI (obstructive sleep apnea)     Past Surgical History:   Procedure Laterality Date     BACK SURGERY  2007    L4/L5 Laminectomy Discectomy     L4/5 LAMINECT/DISCECTOMY, LUMBAR  2007     PROSTATE SURGERY  08/14/2018    TRUS WITH BX     TESTICLE SURGERY       VASECTOMY  2005     VASECTOMY         Social History     Tobacco Use     Smoking status: Never Smoker     Smokeless tobacco: Never Used   Substance Use Topics     Alcohol use: Yes     Comment: occasional social use, maybe 1x/wk     Family History   Problem Relation Age of Onset     Family History Negative Mother      Coronary Artery Disease Maternal Grandmother      Cardiovascular Maternal Uncle         Heart bypass at age 33     Prostate Cancer Maternal Uncle      Gastrointestinal Disease Child         celiac sprue     Hypertension Paternal Grandmother      Cerebrovascular Disease Paternal Grandmother      Cerebrovascular Disease Maternal Grandfather      Diabetes No family hx of      Cancer - colorectal No family hx of            Reviewed and updated as needed this visit by Provider         Review of Systems   Constitutional, HEENT, cardiovascular, pulmonary, gi and gu systems are negative, except as otherwise noted.      Objective    There were no vitals taken for this visit.  Estimated body mass index is 27.62 kg/m  as calculated from the following:    Height as of 5/8/19: 1.803 m (5' 11\").    Weight as of 5/8/19: 89.8 kg (198 lb).  Physical Exam     GENERAL: Healthy, alert and no distress  EYES: Eyes grossly normal to inspection.  No discharge or erythema, or obvious scleral/conjunctival abnormalities.  RESP: No audible wheeze, cough, or visible cyanosis.  No visible retractions or increased work of breathing.    SKIN: Visible skin clear. No significant rash, abnormal pigmentation or lesions.  NEURO: Cranial nerves grossly intact.  Mentation " and speech appropriate for age.  PSYCH: Mentation appears normal, affect normal/bright, judgement and insight intact, normal speech and appearance well-groomed.      Diagnostic Test Results:  Labs reviewed in Epic        Assessment & Plan     1. Benign prostatic hyperplasia with weak urinary stream  Symptoms controlled. UroFlow thru [place] wnl. Continue present management. Trend PSA  - tamsulosin (FLOMAX) 0.4 MG capsule; Take 1 capsule (0.4 mg) by mouth daily  Dispense: 90 capsule; Refill: 1    2. Elevated blood pressure reading without diagnosis of hypertension  During executive physical with [facility]. He has improved diet and activity levels since then. Will check with nurse only appt in the next few weeks and recommend home monitoring       Return in about 6 months (around 12/3/2020).    Juan Ramon Staley PA-C  [facility]      Video-Visit Details    Type of service:  Video Visit    Video End Time:1145    Originating Location (pt. Location): Home    Distant Location (provider location):  [facility]     Platform used for Video Visit: AmWell    Return in about 6 months (around 12/3/2020).       Juan Ramon Staley PA-C

## 2020-08-31 ENCOUNTER — TRANSFERRED RECORDS (OUTPATIENT)
Dept: HEALTH INFORMATION MANAGEMENT | Facility: CLINIC | Age: 50
End: 2020-08-31

## 2020-10-14 ENCOUNTER — ALLIED HEALTH/NURSE VISIT (OUTPATIENT)
Dept: NURSING | Facility: CLINIC | Age: 50
End: 2020-10-14
Payer: COMMERCIAL

## 2020-10-14 VITALS — HEART RATE: 65 BPM | SYSTOLIC BLOOD PRESSURE: 141 MMHG | DIASTOLIC BLOOD PRESSURE: 93 MMHG

## 2020-10-14 DIAGNOSIS — Z01.30 BP CHECK: Primary | ICD-10-CM

## 2020-10-14 DIAGNOSIS — Z23 ENCOUNTER FOR IMMUNIZATION: ICD-10-CM

## 2020-10-14 PROCEDURE — 90682 RIV4 VACC RECOMBINANT DNA IM: CPT

## 2020-10-14 PROCEDURE — 90471 IMMUNIZATION ADMIN: CPT

## 2020-10-14 PROCEDURE — 99207 PR NO CHARGE NURSE ONLY: CPT

## 2020-10-14 NOTE — PROGRESS NOTES
Jordan Montano is a 50 year old patient who comes in today for a Blood Pressure check.  Initial BP:  141/93 p 65 Patient compared with his B/P machine and his was 147/96 p74  Disposition: results routed to provider  Baylee Cruz LPN

## 2020-10-21 DIAGNOSIS — G47.33 OBSTRUCTIVE SLEEP APNEA (ADULT) (PEDIATRIC): Primary | ICD-10-CM

## 2020-11-26 DIAGNOSIS — R39.12 BENIGN PROSTATIC HYPERPLASIA WITH WEAK URINARY STREAM: ICD-10-CM

## 2020-11-26 DIAGNOSIS — N40.1 BENIGN PROSTATIC HYPERPLASIA WITH WEAK URINARY STREAM: ICD-10-CM

## 2020-11-27 NOTE — TELEPHONE ENCOUNTER
tamsulosin (FLOMAX) 0.4 MG capsule  Last Written Prescription Date:  6/3/20  Last Fill Quantity: 90,   # refills: 1  Last Office Visit: 6/3/20  Future Office visit:       Routing refill request to provider for review/approval because:  Blood pressure out of range     Lu Blevins RN on 11/27/2020 at 1:39 PM

## 2020-11-29 RX ORDER — TAMSULOSIN HYDROCHLORIDE 0.4 MG/1
CAPSULE ORAL
Qty: 90 CAPSULE | Refills: 1 | Status: SHIPPED | OUTPATIENT
Start: 2020-11-29 | End: 2021-05-25

## 2020-11-29 NOTE — TELEPHONE ENCOUNTER
Refilled. Please let him know I recommend a BP check sometime in the next month - his repeat check earlier this fall was abnormal still.

## 2020-11-30 NOTE — TELEPHONE ENCOUNTER
Patient advised, has an at home BP cuff. Will measure BP over next couple of weeks and send in results. If PCP still wants BP nurse only after those measurements then patient will schedule.  -Amara Zavala

## 2020-12-15 ENCOUNTER — MYC MEDICAL ADVICE (OUTPATIENT)
Dept: FAMILY MEDICINE | Facility: CLINIC | Age: 50
End: 2020-12-15

## 2021-05-15 ENCOUNTER — HEALTH MAINTENANCE LETTER (OUTPATIENT)
Age: 51
End: 2021-05-15

## 2021-05-24 DIAGNOSIS — R39.12 BENIGN PROSTATIC HYPERPLASIA WITH WEAK URINARY STREAM: ICD-10-CM

## 2021-05-24 DIAGNOSIS — N40.1 BENIGN PROSTATIC HYPERPLASIA WITH WEAK URINARY STREAM: ICD-10-CM

## 2021-05-25 RX ORDER — TAMSULOSIN HYDROCHLORIDE 0.4 MG/1
CAPSULE ORAL
Qty: 30 CAPSULE | Refills: 0 | Status: SHIPPED | OUTPATIENT
Start: 2021-05-25 | End: 2021-06-22

## 2021-05-25 NOTE — TELEPHONE ENCOUNTER
tamsulosin (FLOMAX) 0.4 MG capsule   Last Written Prescription Date:  11/29/20  Last Fill Quantity: 90,   # refills: 1  Last Office Visit: 3/3/20  Future Office visit:       Routing refill request to provider for review/approval because:  Blood pressure out of range     Lu Blevins RN on 5/25/2021 at 9:38 AM

## 2021-06-22 DIAGNOSIS — R39.12 BENIGN PROSTATIC HYPERPLASIA WITH WEAK URINARY STREAM: ICD-10-CM

## 2021-06-22 DIAGNOSIS — N40.1 BENIGN PROSTATIC HYPERPLASIA WITH WEAK URINARY STREAM: ICD-10-CM

## 2021-06-22 RX ORDER — TAMSULOSIN HYDROCHLORIDE 0.4 MG/1
CAPSULE ORAL
Qty: 30 CAPSULE | Refills: 0 | Status: SHIPPED | OUTPATIENT
Start: 2021-06-22 | End: 2021-07-22

## 2021-07-21 DIAGNOSIS — N40.1 BENIGN PROSTATIC HYPERPLASIA WITH WEAK URINARY STREAM: ICD-10-CM

## 2021-07-21 DIAGNOSIS — R39.12 BENIGN PROSTATIC HYPERPLASIA WITH WEAK URINARY STREAM: ICD-10-CM

## 2021-07-22 RX ORDER — TAMSULOSIN HYDROCHLORIDE 0.4 MG/1
CAPSULE ORAL
Qty: 30 CAPSULE | Refills: 0 | Status: SHIPPED | OUTPATIENT
Start: 2021-07-22 | End: 2021-08-19

## 2021-07-22 NOTE — TELEPHONE ENCOUNTER
"Last refill stated no refills without appt. Unable to do another lv refill PSO.    UBIKODt message from patient:    \"I will be on vacation August 4-13.  It looks like [facility] does not have appointments prior to my departure.  I setup an appointment for August 16th as that appears to be soonest available.   If this is something that can be done via a phone call, please let me know and we can complete today or tomorrow.\"      Please send refill to pharmacy if approved.    Indigo Del Rio RN    "

## 2021-07-22 NOTE — TELEPHONE ENCOUNTER
Patient given lv refill x1. Please assist with scheduling appointment.    Route to provider to extend refill if coverage needed.    Indigo Del Rio RN

## 2021-08-19 ENCOUNTER — OFFICE VISIT (OUTPATIENT)
Dept: FAMILY MEDICINE | Facility: CLINIC | Age: 51
End: 2021-08-19
Payer: COMMERCIAL

## 2021-08-19 VITALS
SYSTOLIC BLOOD PRESSURE: 144 MMHG | BODY MASS INDEX: 26.25 KG/M2 | DIASTOLIC BLOOD PRESSURE: 90 MMHG | RESPIRATION RATE: 14 BRPM | TEMPERATURE: 97.6 F | HEART RATE: 67 BPM | WEIGHT: 187.5 LBS | HEIGHT: 71 IN | OXYGEN SATURATION: 99 %

## 2021-08-19 DIAGNOSIS — G47.33 OSA (OBSTRUCTIVE SLEEP APNEA): ICD-10-CM

## 2021-08-19 DIAGNOSIS — N40.1 BENIGN PROSTATIC HYPERPLASIA WITH WEAK URINARY STREAM: ICD-10-CM

## 2021-08-19 DIAGNOSIS — I10 ESSENTIAL HYPERTENSION: Primary | ICD-10-CM

## 2021-08-19 DIAGNOSIS — R39.12 BENIGN PROSTATIC HYPERPLASIA WITH WEAK URINARY STREAM: ICD-10-CM

## 2021-08-19 PROCEDURE — 99214 OFFICE O/P EST MOD 30 MIN: CPT | Performed by: PHYSICIAN ASSISTANT

## 2021-08-19 RX ORDER — TAMSULOSIN HYDROCHLORIDE 0.4 MG/1
0.4 CAPSULE ORAL DAILY
Qty: 90 CAPSULE | Refills: 3 | Status: SHIPPED | OUTPATIENT
Start: 2021-08-19 | End: 2022-09-15

## 2021-08-19 ASSESSMENT — PAIN SCALES - GENERAL: PAINLEVEL: NO PAIN (0)

## 2021-08-19 ASSESSMENT — MIFFLIN-ST. JEOR: SCORE: 1726.58

## 2021-08-19 NOTE — PATIENT INSTRUCTIONS
1. Check back in on the CPAP    2. Watch the sodium    3. Increase the exercise    4. Let me know how the appt at [facility] goes - if BP still high, will want to add meds   --Ask them about the Hep C and HIV screening

## 2021-08-19 NOTE — PROGRESS NOTES
"    Assessment & Plan     Essential hypertension  New Dx but was trending this way for a while. We'll first have him implement a few more lifestyle changes before his physical at [facility] later this fall. If still elevated at that time he'll write and ill send in an Rx    Benign prostatic hyperplasia with weak urinary stream  Controlled. Update PSA through [facility]. refilling  - tamsulosin (FLOMAX) 0.4 MG capsule; Take 1 capsule (0.4 mg) by mouth daily    ROSHNI (obstructive sleep apnea)  He may need replacement products per his most recent recall letter. He'll call if needing help with this  BMI:   Estimated body mass index is 25.97 kg/m  as calculated from the following:    Height as of this encounter: 1.81 m (5' 11.25\").    Weight as of this encounter: 85 kg (187 lb 8 oz).        Return in about 1 year (around 8/19/2022).    DREW Quintanilla [facility] WINIFRED Ortega is a 51 year old who presents for the following health issues     HPI     Medication Followup of Tamsulosin    Taking Medication as prescribed: yes    Side Effects:  None    Medication Helping Symptoms:  yes     Jordan Montano is a 51 year old male who presents today for follow up to flomax  He mentions that if missing days his urine will come down to a trickle  While on medication - urination is tolerable    He has lost weight since last seen  Started to run over the pandemic  Got a stress fracture which has since healed  Now back to running but less regularly  Diet mostly healthy; better with portions  Sodium trying to reduce  Does have a BP cuff at home; not checking as regularly  When testing he would get between 135-145/high 80s-low90s    Has stopped use of CPAP since recall of his machine      Review of Systems   Constitutional, HEENT, cardiovascular, pulmonary, gi and gu systems are negative, except as otherwise noted.      Objective    BP (!) 144/90   Pulse 67   Temp 97.6  F (36.4  C) (Oral)   Resp 14   Ht " "1.81 m (5' 11.25\")   Wt 85 kg (187 lb 8 oz)   SpO2 99%   BMI 25.97 kg/m    Body mass index is 25.97 kg/m .  Physical Exam   GENERAL: healthy, alert and no distress  RESP: lungs clear to auscultation - no rales, rhonchi or wheezes  CV: regular rate and rhythm, normal S1 S2, no S3 or S4, no murmur, click or rub, no peripheral edema and peripheral pulses strong  MS: No peripheral edema   PSYCH: mentation appears normal, affect normal/bright    Reviewed [facility] labs          "

## 2021-08-23 ENCOUNTER — MYC MEDICAL ADVICE (OUTPATIENT)
Dept: SLEEP MEDICINE | Facility: CLINIC | Age: 51
End: 2021-08-23

## 2021-10-24 ENCOUNTER — HEALTH MAINTENANCE LETTER (OUTPATIENT)
Age: 51
End: 2021-10-24

## 2022-06-05 ENCOUNTER — HEALTH MAINTENANCE LETTER (OUTPATIENT)
Age: 52
End: 2022-06-05

## 2022-10-16 ENCOUNTER — HEALTH MAINTENANCE LETTER (OUTPATIENT)
Age: 52
End: 2022-10-16

## 2023-01-11 NOTE — LETTER
RE: Jordan Montano  [id number] [address] [id number]     Dear Colleague,    Thank you for referring your patient, Jordan Montano, to the [facility] [place] [facility]. Please see a copy of my visit note below.    Office Visit Note  [facility]  [phone]    UROLOGIC DIAGNOSES:   Elevated PSA, enlarged prostate, history of ureteral stone    CURRENT INTERVENTIONS:   Negative prostate biopsy in August 2018,, Flomax    HISTORY:   Jordan returns to clinic today for follow-up. He had a negative prostate biopsy in August that showed the prostate to be enlarged at 52 cm . He has not yet had his PSA rechecked since that time. He currently has no urinary symptoms or complaints while he is on Flomax. He does notice a diminishment of his urinary stream if he forgets to take the medication.      PAST MEDICAL HISTORY:   Past Medical History:   Diagnosis Date     NO ACTIVE PROBLEMS      PAST SURGICAL HISTORY:   Past Surgical History:   Procedure Laterality Date     L4/5 LAMINECT/DISCECTOMY, LUMBAR  2007     PROSTATE SURGERY  08/14/2018    TRUS WITH BX     TESTICLE SURGERY       VASECTOMY  2005     VASECTOMY         FAMILY HISTORY:   Family History   Problem Relation Age of Onset     Family History Negative Mother      Cardiovascular Maternal Uncle         Heart bypass at age 33     Prostate Cancer Maternal Uncle      Gastrointestinal Disease Child         celiac sprue     Diabetes No family hx of      Cancer - colorectal No family hx of        SOCIAL HISTORY:   Social History     Tobacco Use     Smoking status: Never Smoker     Smokeless tobacco: Never Used   Substance Use Topics     Alcohol use: Yes     Comment: occasional social use       Current Outpatient Medications   Medication     atovaquone-proguanil (MALARONE) 250-100 MG per tablet     ciprofloxacin (CIPRO) 500 MG tablet     ciprofloxacin (CIPRO) 500 MG tablet      tamsulosin (FLOMAX) 0.4 MG capsule     typhoid (VIVOTIF) CR capsule     No current facility-administered medications for this visit.      PHYSICAL EXAM:    There were no vitals taken for this visit.    Constitutional: Well developed. Conversant and in no acute distress  Eyes: Anicteric sclera, conjunctiva clear, normal extraocular movements  ENT: Normocephalic and atraumatic,   Skin: Warm and dry. No rashes or lesions  Cardiac: No peripheral edema  Back/Flank: Not done  CNS/PNS: Normal musculature and movements, moves all extremities normally  Respiratory: Normal non-labored breathing  Abdomen: Soft nontender and nondistended  Peripheral Vascular: No peripheral edema  Mental Status/Psych: Alert and Oriented x 3. Normal mood and affect    Penis: Not done  Scrotal Skin: Not done  Testicles: Not done  Epididymis: Not done  Digital Rectal Exam: his prostate is moderately enlarged,  Benign and symmetric to palpation    Cystoscopy: Not done    Imaging: None    Urinalysis: UA RESULTS:  Recent Labs   Lab Test 05/08/18  1342   COLOR Yellow   APPEARANCE Clear   URINEGLC Negative   URINEBILI Negative   URINEKETONE 40*   SG 1.010   UBLD Trace*   URINEPH 6.0   PROTEIN Negative   UROBILINOGEN 0.2   NITRITE Negative   LEUKEST Negative   RBCU O - 2   WBCU 0 - 5       PSA:     Post Void Residual:     Other labs: None today      IMPRESSION:  Enlarged prostate, elevated PSA    PLAN:  His examination is benign. He has not yet had his PSA rechecked since his biopsy but has plans to do so on Monday.  I reminded him to the PSA checked on Monday, if it is unchanged at that time we will see him back again in 6 months for another PSA and exam.    Total Time: 15 minutes                                      Total in Consultation: 15 minutes      Tomás Mary M.D.   Unknown at this time

## 2023-06-06 NOTE — NURSING NOTE
Pvr=21  No ua at this time.  Pt voided about 20 min ago.  Pt states no voiding trouble.  Sometimes has freq.  Pt up 1-2 times a nt to void.  Pt denies dysuria.  Sometimes it takes pt a min to get urine flow going.  Pt had a stone in march.  Pt passed the stone.    ROSIO Boyd, CMA     100% of the time/able to follow single-step instructions

## 2023-06-17 ENCOUNTER — HEALTH MAINTENANCE LETTER (OUTPATIENT)
Age: 53
End: 2023-06-17

## 2024-03-20 NOTE — TELEPHONE ENCOUNTER
Routing refill request to provider for review/approval because:  Abida given x1 and patient did not follow up, please advise  Labs out of range:  BP  Patient needs to be seen because it has been more than 1 year since last office visit.    BP Readings from Last 3 Encounters:   10/14/20 (!) 141/93   05/08/19 137/87   04/03/19 125/79     Angel Luis SANZ RN         Self

## 2024-04-16 ENCOUNTER — MYC MEDICAL ADVICE (OUTPATIENT)
Dept: FAMILY MEDICINE | Facility: CLINIC | Age: 54
End: 2024-04-16
Payer: COMMERCIAL

## 2024-04-19 ENCOUNTER — VIRTUAL VISIT (OUTPATIENT)
Dept: FAMILY MEDICINE | Facility: CLINIC | Age: 54
End: 2024-04-19
Payer: COMMERCIAL

## 2024-04-19 DIAGNOSIS — I10 ESSENTIAL HYPERTENSION: ICD-10-CM

## 2024-04-19 DIAGNOSIS — R39.12 BENIGN PROSTATIC HYPERPLASIA WITH WEAK URINARY STREAM: Primary | ICD-10-CM

## 2024-04-19 DIAGNOSIS — N40.1 BENIGN PROSTATIC HYPERPLASIA WITH WEAK URINARY STREAM: Primary | ICD-10-CM

## 2024-04-19 PROCEDURE — 99214 OFFICE O/P EST MOD 30 MIN: CPT | Mod: 95 | Performed by: PHYSICIAN ASSISTANT

## 2024-04-19 RX ORDER — HYDROCORTISONE 2.5 %
CREAM (GRAM) TOPICAL
COMMUNITY
Start: 2024-04-08

## 2024-04-19 RX ORDER — LOSARTAN POTASSIUM 25 MG/1
25 TABLET ORAL DAILY
COMMUNITY
Start: 2024-04-02 | End: 2024-05-20

## 2024-04-19 RX ORDER — TAMSULOSIN HYDROCHLORIDE 0.4 MG/1
0.4 CAPSULE ORAL 2 TIMES DAILY
Qty: 180 CAPSULE | Refills: 2 | Status: SHIPPED | OUTPATIENT
Start: 2024-04-19

## 2024-04-19 RX ORDER — VITAMIN B COMPLEX
25 TABLET ORAL
COMMUNITY

## 2024-04-19 RX ORDER — LOSARTAN POTASSIUM 25 MG/1
25 TABLET ORAL DAILY
Qty: 90 TABLET | Refills: 0 | Status: SHIPPED | OUTPATIENT
Start: 2024-04-19 | End: 2024-07-22

## 2024-04-19 NOTE — PROGRESS NOTES
Jordan is a 54 year old who is being evaluated via a billable video visit.    How would you like to obtain your AVS? MyChart  If the video visit is dropped, the invitation should be resent by: Text to cell phone: [phone]  Will anyone else be joining your video visit? No      Assessment & Plan     Benign prostatic hyperplasia with weak urinary stream  Recent increased PSA but with grossly normal MRI save for the size. Continue present management. Follow up with repeat PSA thru [place] 2025  - tamsulosin (FLOMAX) 0.4 MG capsule; Take 1 capsule (0.4 mg) by mouth 2 times daily    Essential hypertension  New dx though longstanding history. Continue to focus on lifestyle. Follow-up bmp here in clinic in next month and repeat BP check with nurse in 3 months. INcrease to 50mg if not <130/85  - Basic metabolic panel  (Ca, Cl, CO2, Creat, Gluc, K, Na, BUN); Future  - losartan (COZAAR) 25 MG tablet; Take 1 tablet (25 mg) by mouth daily              Subjective   Jordan is a 54 year old, presenting for the following health issues:  Follow Up        4/19/2024    12:19 PM   Additional Questions   Roomed by Christ GORDON   Accompanied by no one         4/19/2024    12:19 PM   Patient Reported Additional Medications   Patient reports taking the following new medications Fish Oil     History of Present Illness       Reason for visit:  Follow up from [facility] executive physical    He eats 2-3 servings of fruits and vegetables daily.He consumes 0 sweetened beverage(s) daily.He exercises with enough effort to increase his heart rate 30 to 60 minutes per day.  He exercises with enough effort to increase his heart rate 5 days per week.   He is taking medications regularly.       Concern - Follow up From executive Physical  Onset: 2 weeks  Description: Follow up from the physical [facility]  Intensity: na  Progression of Symptoms:  na  Accompanying Signs & Symptoms: na  Previous history of similar problem: na  Precipitating factors:         Worsened by: na  Alleviating factors:        Improved by: na  Therapies tried and outcome:  none     Jordan Montano is a 54 year old male who presents today for brief check up  He recently completed his executive physical through [place]    BP was elevated again at that visit  Wife also dx with cholesterol elevation and so now focused on diet improvements together    Started on BP medications  Losartan 25mg  Tolerating well  Home checks have been improved   -now in the 130s/70-80s    Elevated PSA, elevated mass of prostate on MRI  Occasionally experiencing some hesitancy/low flow but overall tolerability  On flomax 0.4mg twice daily but missing afternoon dose with some regularity          Review of Systems  Constitutional, HEENT, cardiovascular, pulmonary, gi and gu systems are negative, except as otherwise noted.      Objective           Vitals:  No vitals were obtained today due to virtual visit.    Physical Exam   GENERAL: alert and no distress  EYES: Eyes grossly normal to inspection.  No discharge or erythema, or obvious scleral/conjunctival abnormalities.  RESP: No audible wheeze, cough, or visible cyanosis.    SKIN: Visible skin clear. No significant rash, abnormal pigmentation or lesions.  NEURO: Cranial nerves grossly intact.  Mentation and speech appropriate for age.  PSYCH: Appropriate affect, tone, and pace of words          Video-Visit Details    Type of service:  Video Visit   Originating Location (pt. Location): Home    Distant Location (provider location):  Off-site  Platform used for Video Visit: Rosalino  Signed Electronically by: Juan Ramon Staley PA-C

## 2024-05-17 ENCOUNTER — ALLIED HEALTH/NURSE VISIT (OUTPATIENT)
Dept: FAMILY MEDICINE | Facility: CLINIC | Age: 54
End: 2024-05-17
Payer: COMMERCIAL

## 2024-05-17 ENCOUNTER — LAB (OUTPATIENT)
Dept: LAB | Facility: CLINIC | Age: 54
End: 2024-05-17
Attending: PHYSICIAN ASSISTANT
Payer: COMMERCIAL

## 2024-05-17 ENCOUNTER — TELEPHONE (OUTPATIENT)
Dept: FAMILY MEDICINE | Facility: CLINIC | Age: 54
End: 2024-05-17

## 2024-05-17 VITALS — DIASTOLIC BLOOD PRESSURE: 84 MMHG | HEART RATE: 62 BPM | SYSTOLIC BLOOD PRESSURE: 135 MMHG

## 2024-05-17 DIAGNOSIS — I10 ESSENTIAL HYPERTENSION: ICD-10-CM

## 2024-05-17 DIAGNOSIS — I10 ESSENTIAL HYPERTENSION: Primary | ICD-10-CM

## 2024-05-17 PROCEDURE — 99207 PR NO CHARGE NURSE ONLY: CPT

## 2024-05-17 PROCEDURE — 80048 BASIC METABOLIC PNL TOTAL CA: CPT

## 2024-05-17 PROCEDURE — 36415 COLL VENOUS BLD VENIPUNCTURE: CPT

## 2024-05-17 NOTE — PROGRESS NOTES
Jordan Montano is a 54 year old patient who comes in today for a Blood Pressure check.  Initial BP:  /84 (BP Location: Right arm, Patient Position: Sitting, Cuff Size: Adult Large)   Pulse 62      62  Disposition: results routed to provider    BP Readings from Last 3 Encounters:   05/17/24 135/84   08/19/21 (!) 144/90   10/14/20 (!) 141/93         Pt brought in bp machine to compare to in clinics, patients at home bp machine is reading his blood pressure at 147/99 and 142/80, we compared his bp again and his bp in clinic for second attempt was at 129/87.     Fawn JUSTIN MA

## 2024-05-18 LAB
ANION GAP SERPL CALCULATED.3IONS-SCNC: 9 MMOL/L (ref 7–15)
BUN SERPL-MCNC: 21.6 MG/DL (ref 6–20)
CALCIUM SERPL-MCNC: 9 MG/DL (ref 8.6–10)
CHLORIDE SERPL-SCNC: 103 MMOL/L (ref 98–107)
CREAT SERPL-MCNC: 1.18 MG/DL (ref 0.67–1.17)
DEPRECATED HCO3 PLAS-SCNC: 29 MMOL/L (ref 22–29)
EGFRCR SERPLBLD CKD-EPI 2021: 73 ML/MIN/1.73M2
GLUCOSE SERPL-MCNC: 102 MG/DL (ref 70–99)
POTASSIUM SERPL-SCNC: 4.7 MMOL/L (ref 3.4–5.3)
SODIUM SERPL-SCNC: 141 MMOL/L (ref 135–145)

## 2024-05-20 ENCOUNTER — MYC MEDICAL ADVICE (OUTPATIENT)
Dept: FAMILY MEDICINE | Facility: CLINIC | Age: 54
End: 2024-05-20

## 2024-05-20 ENCOUNTER — OFFICE VISIT (OUTPATIENT)
Dept: FAMILY MEDICINE | Facility: CLINIC | Age: 54
End: 2024-05-20
Payer: COMMERCIAL

## 2024-05-20 VITALS
HEIGHT: 71 IN | BODY MASS INDEX: 27.94 KG/M2 | WEIGHT: 199.6 LBS | TEMPERATURE: 97.8 F | RESPIRATION RATE: 16 BRPM | SYSTOLIC BLOOD PRESSURE: 128 MMHG | OXYGEN SATURATION: 97 % | DIASTOLIC BLOOD PRESSURE: 81 MMHG | HEART RATE: 58 BPM

## 2024-05-20 DIAGNOSIS — L74.510 PRIMARY FOCAL HYPERHIDROSIS OF AXILLA: Primary | ICD-10-CM

## 2024-05-20 PROCEDURE — 99213 OFFICE O/P EST LOW 20 MIN: CPT | Performed by: PHYSICIAN ASSISTANT

## 2024-05-21 ENCOUNTER — TELEPHONE (OUTPATIENT)
Dept: FAMILY MEDICINE | Facility: CLINIC | Age: 54
End: 2024-05-21
Payer: COMMERCIAL

## 2024-05-21 DIAGNOSIS — L74.510 PRIMARY FOCAL HYPERHIDROSIS OF AXILLA: Primary | ICD-10-CM

## 2024-05-21 NOTE — TELEPHONE ENCOUNTER
Pharmacy:  Not covered: aluminum chloride (DRYSOL) 20 % external solution   Alterative: Bromi-Lotion

## 2024-05-22 RX ORDER — ALUMINUM CHLORIDE 20 %
LOTION (ML) TOPICAL DAILY
Qty: 120 ML | Refills: 0 | Status: SHIPPED | OUTPATIENT
Start: 2024-05-22

## 2024-07-21 ENCOUNTER — MYC MEDICAL ADVICE (OUTPATIENT)
Dept: FAMILY MEDICINE | Facility: CLINIC | Age: 54
End: 2024-07-21
Payer: COMMERCIAL

## 2024-07-21 DIAGNOSIS — I10 ESSENTIAL HYPERTENSION: ICD-10-CM

## 2024-07-22 RX ORDER — LOSARTAN POTASSIUM 25 MG/1
25 TABLET ORAL DAILY
Qty: 90 TABLET | Refills: 2 | Status: SHIPPED | OUTPATIENT
Start: 2024-07-22

## 2024-08-10 ENCOUNTER — HEALTH MAINTENANCE LETTER (OUTPATIENT)
Age: 54
End: 2024-08-10

## 2025-03-04 ENCOUNTER — TRANSFERRED RECORDS (OUTPATIENT)
Dept: HEALTH INFORMATION MANAGEMENT | Facility: CLINIC | Age: 55
End: 2025-03-04
Payer: COMMERCIAL

## 2025-03-11 ENCOUNTER — OFFICE VISIT (OUTPATIENT)
Dept: URGENT CARE | Facility: URGENT CARE | Age: 55
End: 2025-03-11
Payer: COMMERCIAL

## 2025-03-11 VITALS
HEART RATE: 89 BPM | TEMPERATURE: 97.1 F | BODY MASS INDEX: 27.75 KG/M2 | SYSTOLIC BLOOD PRESSURE: 146 MMHG | WEIGHT: 199 LBS | OXYGEN SATURATION: 96 % | DIASTOLIC BLOOD PRESSURE: 89 MMHG | RESPIRATION RATE: 18 BRPM

## 2025-03-11 DIAGNOSIS — H01.004 BLEPHARITIS OF LEFT UPPER EYELID, UNSPECIFIED TYPE: Primary | ICD-10-CM

## 2025-03-11 PROCEDURE — 3079F DIAST BP 80-89 MM HG: CPT | Performed by: PREVENTIVE MEDICINE

## 2025-03-11 PROCEDURE — 3077F SYST BP >= 140 MM HG: CPT | Performed by: PREVENTIVE MEDICINE

## 2025-03-11 PROCEDURE — 99214 OFFICE O/P EST MOD 30 MIN: CPT | Performed by: PREVENTIVE MEDICINE

## 2025-03-11 RX ORDER — ERYTHROMYCIN 5 MG/G
0.5 OINTMENT OPHTHALMIC 3 TIMES DAILY
Qty: 3.5 G | Refills: 0 | Status: SHIPPED | OUTPATIENT
Start: 2025-03-11 | End: 2025-03-18

## 2025-03-11 NOTE — PROGRESS NOTES
Assessment & Plan     (H01.004) Blepharitis of left upper eyelid, unspecified type  (primary encounter diagnosis)  Plan: erythromycin (ROMYCIN) 5 MG/GM ophthalmic         ointment    Erythromycin ointment for one week  Warm compresses  Lid cleaning  Follow up in 10-14 days if not improving or sooner as needed       32 minutes spent by me on the date of the encounter doing chart review, history and exam, documentation and further activities per the note        No follow-ups on file.    Marc Roy MD  [facility]    Subjective     Jordan Montano is a 54 year old year old male who presents to clinic today for the following health issues:    Patient presents with:  Urgent Care: Left eye swelling and soreness x this morning    This is a 53 yo male who has had left eyelid swelling over the past day.  No trauma.  No eye pain, headache, nausea, change in vision.  No congestion, cough, fever.      Patient Active Problem List   Diagnosis    Cardiac Risk Factors    Lumbar Degenerative Disc Dz    CARDIOVASCULAR SCREENING; LDL GOAL LESS THAN 160    BPH (benign prostatic hyperplasia)    ROSHNI (obstructive sleep apnea)       Current Outpatient Medications   Medication Sig Dispense Refill    losartan (COZAAR) 25 MG tablet Take 1 tablet (25 mg) by mouth daily 90 tablet 2    tamsulosin (FLOMAX) 0.4 MG capsule Take 1 capsule (0.4 mg) by mouth 2 times daily 180 capsule 2    aluminum chloride (DRYSOL) 20 % external solution Apply topically at bedtime (Patient not taking: Reported on 3/11/2025) 60 mL 0    Dermatological Products, Misc. (BROMI-LOTION) LOTN Externally apply topically daily Wash hands following use (Patient not taking: Reported on 3/11/2025) 120 mL 0    hydrocortisone 2.5 % cream Apply to eyelids twice a day on the eyelids for up to 6 weeks. (Patient not taking: Reported on 3/11/2025)      Vitamin D3 (CHOLECALCIFEROL) 25 mcg (1000 units) tablet Take 25 mcg by mouth (Patient not taking:  Reported on 3/11/2025)       No current facility-administered medications for this visit.       Past Medical History:   Diagnosis Date    NO ACTIVE PROBLEMS        Social History   reports that he has never smoked. He has never been exposed to tobacco smoke. He has never used smokeless tobacco. He reports current alcohol use. He reports that he does not use drugs.    Family History   Problem Relation Age of Onset    Family History Negative Mother     Coronary Artery Disease Maternal Grandmother     Cardiovascular Maternal Uncle         Heart bypass at age 33    Prostate Cancer Maternal Uncle     Gastrointestinal Disease Child         celiac sprue    Hypertension Paternal Grandmother     Cerebrovascular Disease Paternal Grandmother     Cerebrovascular Disease Maternal Grandfather     Diabetes No family hx of     Cancer - colorectal No family hx of        Review of Systems  Constitutional, HEENT, cardiovascular, pulmonary, GI, , musculoskeletal, neuro, skin, endocrine and psych systems are negative, except as otherwise noted.      Objective    BP (!) 146/89 (BP Location: Right arm, Patient Position: Sitting, Cuff Size: Adult Large)   Pulse 89   Temp 97.1  F (36.2  C) (Tympanic)   Resp 18   Wt 90.3 kg (199 lb)   SpO2 96%   BMI 27.75 kg/m    Physical Exam   GENERAL: alert and no distress  EYES: Eyes grossly normal to inspection, PERRL and conjunctivae and sclerae normal, left eyelid with erythema and slightly swollen  HENT: ear canals and TM's normal, nose and mouth without ulcers or lesions  NECK: no adenopathy, no asymmetry, masses, or scars  RESP: lungs clear to auscultation - no rales, rhonchi or wheezes  CV: regular rate and rhythm, normal S1 S2, no S3 or S4, no murmur, click or rub, no peripheral edema  ABDOMEN: soft, nontender, no hepatosplenomegaly, no masses and bowel sounds normal  MS: no gross musculoskeletal defects noted, no edema  SKIN: no suspicious lesions or rashes  NEURO: Normal strength and  tone, mentation intact and speech normal  PSYCH: mentation appears normal, affect normal/bright

## 2025-03-13 ENCOUNTER — PATIENT OUTREACH (OUTPATIENT)
Dept: GASTROENTEROLOGY | Facility: CLINIC | Age: 55
End: 2025-03-13
Payer: COMMERCIAL

## 2025-03-13 PROBLEM — D12.6 ADENOMATOUS COLON POLYP: Status: ACTIVE | Noted: 2025-03-13

## 2025-03-24 ENCOUNTER — TELEPHONE (OUTPATIENT)
Dept: FAMILY MEDICINE | Facility: CLINIC | Age: 55
End: 2025-03-24
Payer: COMMERCIAL

## 2025-03-24 NOTE — TELEPHONE ENCOUNTER
Patient Quality Outreach    Patient is due for the following:   Hypertension -  Hypertension follow-up visit  Physical Preventive Adult Physical    Action(s) Taken:   Schedule a Adult Preventative    Type of outreach:    Sent SIPX message.    Questions for provider review:    None         Neena Palmer  Chart routed to .

## 2025-04-12 DIAGNOSIS — I10 ESSENTIAL HYPERTENSION: ICD-10-CM

## 2025-04-14 RX ORDER — LOSARTAN POTASSIUM 25 MG/1
25 TABLET ORAL DAILY
Qty: 90 TABLET | Refills: 0 | Status: SHIPPED | OUTPATIENT
Start: 2025-04-14

## 2025-08-16 ENCOUNTER — HEALTH MAINTENANCE LETTER (OUTPATIENT)
Age: 55
End: 2025-08-16